# Patient Record
Sex: FEMALE | Race: WHITE | NOT HISPANIC OR LATINO | Employment: UNEMPLOYED | URBAN - METROPOLITAN AREA
[De-identification: names, ages, dates, MRNs, and addresses within clinical notes are randomized per-mention and may not be internally consistent; named-entity substitution may affect disease eponyms.]

---
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
## 2024-01-03 DIAGNOSIS — F41.9 ANXIETY: ICD-10-CM

## 2024-01-03 RX ORDER — BUSPIRONE HYDROCHLORIDE 10 MG/1
TABLET ORAL
Qty: 270 TABLET | Refills: 0 | Status: SHIPPED | OUTPATIENT
Start: 2024-01-03

## 2024-04-07 DIAGNOSIS — F41.9 ANXIETY: ICD-10-CM

## 2024-04-08 RX ORDER — BUSPIRONE HYDROCHLORIDE 10 MG/1
TABLET ORAL
Qty: 270 TABLET | Refills: 0 | Status: SHIPPED | OUTPATIENT
Start: 2024-04-08

## 2024-04-23 ENCOUNTER — OFFICE VISIT (OUTPATIENT)
Dept: URGENT CARE | Facility: CLINIC | Age: 19
End: 2024-04-23
Payer: COMMERCIAL

## 2024-04-23 VITALS
WEIGHT: 131 LBS | RESPIRATION RATE: 16 BRPM | BODY MASS INDEX: 21.83 KG/M2 | HEIGHT: 65 IN | TEMPERATURE: 99.1 F | OXYGEN SATURATION: 97 % | HEART RATE: 90 BPM

## 2024-04-23 DIAGNOSIS — J02.9 SORE THROAT: ICD-10-CM

## 2024-04-23 DIAGNOSIS — J02.0 STREP PHARYNGITIS: Primary | ICD-10-CM

## 2024-04-23 LAB — S PYO AG THROAT QL: NEGATIVE

## 2024-04-23 PROCEDURE — G0382 LEV 3 HOSP TYPE B ED VISIT: HCPCS | Performed by: FAMILY MEDICINE

## 2024-04-23 PROCEDURE — 87070 CULTURE OTHR SPECIMN AEROBIC: CPT | Performed by: FAMILY MEDICINE

## 2024-04-23 PROCEDURE — 87880 STREP A ASSAY W/OPTIC: CPT | Performed by: FAMILY MEDICINE

## 2024-04-23 RX ORDER — METHYLPREDNISOLONE 4 MG/1
TABLET ORAL
Qty: 21 TABLET | Refills: 0 | Status: SHIPPED | OUTPATIENT
Start: 2024-04-23

## 2024-04-23 RX ORDER — AMOXICILLIN 500 MG/1
500 CAPSULE ORAL EVERY 12 HOURS SCHEDULED
Qty: 20 CAPSULE | Refills: 0 | Status: SHIPPED | OUTPATIENT
Start: 2024-04-23 | End: 2024-05-03

## 2024-04-23 NOTE — PROGRESS NOTES
[facility]        NAME: Dasha Da Silva is a 18 y.o. female  : 2005    MRN: [id number]  DATE: 2024  TIME: 12:09 PM    Assessment and Plan   Strep pharyngitis [J02.0]  1. Strep pharyngitis  amoxicillin (AMOXIL) 500 mg capsule    methylPREDNISolone 4 MG tablet therapy pack      2. Sore throat  POCT rapid ANTIGEN strepA            Patient Instructions       Follow up with PCP in 3-5 days.  Proceed to  ER if symptoms worsen.    If tests have been performed at [facility], our office will contact you with results if changes need to be made to the care plan discussed with you at the visit.  [facility]    Chief Complaint     Chief Complaint   Patient presents with    Sore Throat     Pt presents with sore throat, swollen tonsils, and swollen lymph nodes x 1 day.  Fever this morning around 100.  She tylenol this am.           History of Present Illness       18-year-old female with 3-day history of sore throat and painful swallowing.  She does report beginning with a fever this morning around 100 despite using Tylenol.  Denies any headaches nausea or vomiting.    Sore Throat         Review of Systems   Review of Systems   Constitutional: Negative.    HENT:  Positive for sore throat.    Eyes: Negative.    Respiratory: Negative.     Cardiovascular: Negative.    Gastrointestinal: Negative.    Genitourinary: Negative.    Musculoskeletal:  Positive for myalgias.   Skin: Negative.    Allergic/Immunologic: Negative.    Neurological: Negative.    Hematological: Negative.    Psychiatric/Behavioral: Negative.           Current Medications       Current Outpatient Medications:     Adapalene 0.3 % gel, , Disp: , Rfl:     amoxicillin (AMOXIL) 500 mg capsule, Take 1 capsule (500 mg total) by mouth every 12 (twelve) hours for 10 days, Disp: 20 capsule, Rfl: 0    busPIRone (BUSPAR) 10 mg tablet, TAKE 1 TABLET BY MOUTH THREE TIMES A DAY AS NEEDED FOR ANXIETY, Disp: 270  "tablet, Rfl: 0    drospirenone-ethinyl estradiol (Vestura) 3-0.02 MG per tablet, Take 1 tablet by mouth daily, Disp: 84 tablet, Rfl: 3    methylPREDNISolone 4 MG tablet therapy pack, Use as directed on package, Disp: 21 tablet, Rfl: 0    sertraline (ZOLOFT) 50 mg tablet, TAKE 1 AND 1/2 TABLETS BY MOUTH DAILY, Disp: 135 tablet, Rfl: 3    spironolactone (ALDACTONE) 50 mg tablet, Take 50 mg by mouth 2 (two) times a day, Disp: , Rfl:     Current Allergies     Allergies as of 04/23/2024    (No Known Allergies)            The following portions of the patient's history were reviewed and updated as appropriate: allergies, current medications, past family history, past medical history, past social history, past surgical history and problem list.     Past Medical History:   Diagnosis Date    Concussion     Tinea versicolor        Past Surgical History:   Procedure Laterality Date    NO PAST SURGERIES         Family History   Problem Relation Age of Onset    Other Father         smoker    Heart attack Other     Skin cancer Maternal Grandmother     Cancer Maternal Grandfather         bone    Skin cancer Maternal Grandfather          Medications have been verified.        Objective   Pulse 90   Temp 99.1 °F (37.3 °C)   Resp 16   Ht 5' 5\" (1.651 m)   Wt 59.4 kg (131 lb)   SpO2 97%   BMI 21.80 kg/m²   No LMP recorded. (Menstrual status: Birth Control).       Physical Exam     Physical Exam  Vitals and nursing note reviewed.   Constitutional:       Appearance: She is well-developed.   HENT:      Head: Normocephalic.      Right Ear: Tympanic membrane and ear canal normal.      Left Ear: Tympanic membrane and ear canal normal.      Nose: Nose normal.      Mouth/Throat:      Pharynx: Pharyngeal swelling, oropharyngeal exudate and posterior oropharyngeal erythema present.      Tonsils: No tonsillar exudate.   Eyes:      Pupils: Pupils are equal, round, and reactive to light.   Cardiovascular:      Rate and Rhythm: Normal rate " and regular rhythm.      Heart sounds: Normal heart sounds.   Pulmonary:      Effort: Pulmonary effort is normal.   Abdominal:      General: Abdomen is flat.   Musculoskeletal:         General: Normal range of motion.      Cervical back: Normal range of motion.   Skin:     General: Skin is warm and dry.   Neurological:      Mental Status: She is alert and oriented to person, place, and time.

## 2024-04-25 LAB — BACTERIA THROAT CULT: NORMAL

## 2024-05-20 DIAGNOSIS — F41.9 ANXIETY: ICD-10-CM

## 2024-07-09 DIAGNOSIS — F41.9 ANXIETY: ICD-10-CM

## 2024-07-09 RX ORDER — BUSPIRONE HYDROCHLORIDE 10 MG/1
TABLET ORAL
Qty: 270 TABLET | Refills: 0 | Status: SHIPPED | OUTPATIENT
Start: 2024-07-09

## 2024-09-04 ENCOUNTER — OFFICE VISIT (OUTPATIENT)
Dept: FAMILY MEDICINE CLINIC | Facility: CLINIC | Age: 19
End: 2024-09-04
Payer: COMMERCIAL

## 2024-09-04 ENCOUNTER — TELEPHONE (OUTPATIENT)
Age: 19
End: 2024-09-04

## 2024-09-04 VITALS
WEIGHT: 139 LBS | BODY MASS INDEX: 23.16 KG/M2 | HEIGHT: 65 IN | HEART RATE: 99 BPM | SYSTOLIC BLOOD PRESSURE: 100 MMHG | OXYGEN SATURATION: 97 % | DIASTOLIC BLOOD PRESSURE: 60 MMHG | TEMPERATURE: 99.3 F

## 2024-09-04 DIAGNOSIS — J01.00 ACUTE NON-RECURRENT MAXILLARY SINUSITIS: Primary | ICD-10-CM

## 2024-09-04 DIAGNOSIS — R05.1 ACUTE COUGH: ICD-10-CM

## 2024-09-04 PROCEDURE — 99213 OFFICE O/P EST LOW 20 MIN: CPT | Performed by: FAMILY MEDICINE

## 2024-09-04 RX ORDER — BENZONATATE 200 MG/1
200 CAPSULE ORAL 3 TIMES DAILY PRN
Qty: 20 CAPSULE | Refills: 0 | Status: SHIPPED | OUTPATIENT
Start: 2024-09-04

## 2024-09-04 RX ORDER — AMOXICILLIN 875 MG
875 TABLET ORAL 2 TIMES DAILY
Qty: 20 TABLET | Refills: 0 | Status: SHIPPED | OUTPATIENT
Start: 2024-09-04 | End: 2024-09-14

## 2024-09-04 NOTE — TELEPHONE ENCOUNTER
Pt mother called requesting a Dr note for college for dates 9/4/24-9/6/24 be placed in her Mychart please. Thank you

## 2024-09-04 NOTE — PROGRESS NOTES
Assessment/Plan:      1. Acute non-recurrent maxillary sinusitis  Assessment & Plan:  Start amoxicillin 1 tab twice a day     Orders:  -     amoxicillin (AMOXIL) 875 mg tablet; Take 1 tablet (875 mg total) by mouth 2 (two) times a day for 10 days  2. Acute cough  Assessment & Plan:  Benzonatate 1 tab three times a day as needed.   Orders:  -     benzonatate (TESSALON) 200 MG capsule; Take 1 capsule (200 mg total) by mouth 3 (three) times a day as needed for cough        Subjective:  Chief Complaint   Patient presents with    Cold Like Symptoms     Congestion, cough and runny nose. Began Monday with a sore throat. Has taken Dayquil and Zyrtec. Tested Tuesday morning for Covid and it was negative.         Patient ID: Dasha Da Silva is a 19 y.o. female.    Started with congestion and sore throat, started on Monday with a sore throat  Tight in her chest and dry cough  Low grade fever  Dayquil and nyquil with some relief, and zyrtec  Trouble sleeping at night.   Nasal congestion.   Constant sinus pressure.         Review of Systems   Constitutional:  Positive for fatigue.   HENT:  Positive for congestion, postnasal drip, sinus pressure and sore throat.    Eyes: Negative.    Respiratory:  Positive for cough. Negative for shortness of breath.    Cardiovascular: Negative.    Gastrointestinal: Negative.    Genitourinary: Negative.    Musculoskeletal: Negative.    Skin: Negative.    Allergic/Immunologic: Positive for environmental allergies.   Neurological:  Positive for headaches.   Hematological: Negative.    Psychiatric/Behavioral: Negative.           The following portions of the patient's history were reviewed and updated as appropriate: allergies, current medications, past family history, past medical history, past social history, past surgical history and problem list.    Objective:  Vitals:    09/04/24 0955   BP: 100/60   BP Location: Left arm   Patient Position: Sitting   Cuff Size: Standard   Pulse: 99   Temp:  "99.3 °F (37.4 °C)   TempSrc: Tympanic   SpO2: 97%   Weight: 63 kg (139 lb)   Height: 5' 5\" (1.651 m)      Physical Exam  Vitals and nursing note reviewed.   Constitutional:       General: She is not in acute distress.     Appearance: She is well-developed. She is not diaphoretic.   HENT:      Head: Normocephalic and atraumatic.      Right Ear: Tympanic membrane, ear canal and external ear normal.      Left Ear: Tympanic membrane, ear canal and external ear normal.      Nose: Mucosal edema, congestion and rhinorrhea present.      Right Sinus: Maxillary sinus tenderness and frontal sinus tenderness present.      Left Sinus: Maxillary sinus tenderness and frontal sinus tenderness present.      Mouth/Throat:      Mouth: Mucous membranes are normal.      Pharynx: Posterior oropharyngeal edema and posterior oropharyngeal erythema present. No oropharyngeal exudate.   Eyes:      Conjunctiva/sclera: Conjunctivae normal.      Pupils: Pupils are equal, round, and reactive to light.   Neck:      Comments: Few anterior cervical nodes with tnederness  Cardiovascular:      Rate and Rhythm: Normal rate and regular rhythm.      Heart sounds: Normal heart sounds.   Pulmonary:      Effort: Pulmonary effort is normal. No respiratory distress.      Breath sounds: Normal breath sounds. No wheezing.   Abdominal:      General: Bowel sounds are normal.      Palpations: Abdomen is soft.   Musculoskeletal:         General: Normal range of motion.      Cervical back: Normal range of motion and neck supple.   Lymphadenopathy:      Cervical: Cervical adenopathy present.   Skin:     General: Skin is warm and dry.      Findings: No rash.   Neurological:      Mental Status: She is alert and oriented to person, place, and time.   Psychiatric:         Mood and Affect: Mood and affect normal.         Behavior: Behavior normal.         Thought Content: Thought content normal.         Judgment: Judgment normal.         "

## 2024-09-06 DIAGNOSIS — N94.6 DYSMENORRHEA IN ADOLESCENT: ICD-10-CM

## 2024-09-06 RX ORDER — DROSPIRENONE AND ETHINYL ESTRADIOL 0.02-3(28)
1 KIT ORAL DAILY
Qty: 84 TABLET | Refills: 1 | Status: SHIPPED | OUTPATIENT
Start: 2024-09-06

## 2024-09-15 PROBLEM — K13.0 TRAUMATIC LIP PAIN: Status: RESOLVED | Noted: 2021-02-23 | Resolved: 2024-09-15

## 2024-09-15 PROBLEM — Z71.82 EXERCISE COUNSELING: Status: RESOLVED | Noted: 2019-07-13 | Resolved: 2024-09-15

## 2024-09-15 PROBLEM — J01.00 ACUTE NON-RECURRENT MAXILLARY SINUSITIS: Status: ACTIVE | Noted: 2024-09-15

## 2024-09-15 PROBLEM — J02.0 STREP PHARYNGITIS: Status: RESOLVED | Noted: 2022-03-04 | Resolved: 2024-09-15

## 2024-09-15 PROBLEM — R05.1 ACUTE COUGH: Status: ACTIVE | Noted: 2024-09-15

## 2024-09-15 PROBLEM — S06.0X0A CONCUSSION WITHOUT LOSS OF CONSCIOUSNESS: Status: RESOLVED | Noted: 2018-06-15 | Resolved: 2024-09-15

## 2024-09-15 PROBLEM — Z71.3 NUTRITIONAL COUNSELING: Status: RESOLVED | Noted: 2019-07-13 | Resolved: 2024-09-15

## 2024-10-11 DIAGNOSIS — F41.9 ANXIETY: ICD-10-CM

## 2024-10-11 RX ORDER — BUSPIRONE HYDROCHLORIDE 10 MG/1
TABLET ORAL
Qty: 270 TABLET | Refills: 1 | Status: SHIPPED | OUTPATIENT
Start: 2024-10-11

## 2024-10-15 PROBLEM — R05.1 ACUTE COUGH: Status: RESOLVED | Noted: 2024-09-15 | Resolved: 2024-10-15

## 2024-10-15 PROBLEM — J01.00 ACUTE NON-RECURRENT MAXILLARY SINUSITIS: Status: RESOLVED | Noted: 2024-09-15 | Resolved: 2024-10-15

## 2024-12-03 ENCOUNTER — OFFICE VISIT (OUTPATIENT)
Dept: FAMILY MEDICINE CLINIC | Facility: CLINIC | Age: 19
End: 2024-12-03
Payer: COMMERCIAL

## 2024-12-03 VITALS
WEIGHT: 136 LBS | TEMPERATURE: 97.7 F | HEART RATE: 80 BPM | DIASTOLIC BLOOD PRESSURE: 60 MMHG | HEIGHT: 65 IN | OXYGEN SATURATION: 98 % | SYSTOLIC BLOOD PRESSURE: 108 MMHG | BODY MASS INDEX: 22.66 KG/M2

## 2024-12-03 DIAGNOSIS — G89.29 CHRONIC BILATERAL LOW BACK PAIN WITHOUT SCIATICA: ICD-10-CM

## 2024-12-03 DIAGNOSIS — M41.35 THORACOGENIC SCOLIOSIS OF THORACOLUMBAR REGION: ICD-10-CM

## 2024-12-03 DIAGNOSIS — Z13.0 SCREENING FOR ENDOCRINE, METABOLIC AND IMMUNITY DISORDER: ICD-10-CM

## 2024-12-03 DIAGNOSIS — Z13.228 SCREENING FOR ENDOCRINE, METABOLIC AND IMMUNITY DISORDER: ICD-10-CM

## 2024-12-03 DIAGNOSIS — M54.9 CHRONIC UPPER BACK PAIN: ICD-10-CM

## 2024-12-03 DIAGNOSIS — Z13.1 SCREENING FOR DIABETES MELLITUS (DM): ICD-10-CM

## 2024-12-03 DIAGNOSIS — Z13.29 SCREENING FOR ENDOCRINE, METABOLIC AND IMMUNITY DISORDER: ICD-10-CM

## 2024-12-03 DIAGNOSIS — G89.29 CHRONIC UPPER BACK PAIN: ICD-10-CM

## 2024-12-03 DIAGNOSIS — Z13.220 SCREENING FOR LIPID DISORDERS: ICD-10-CM

## 2024-12-03 DIAGNOSIS — M54.50 CHRONIC BILATERAL LOW BACK PAIN WITHOUT SCIATICA: ICD-10-CM

## 2024-12-03 DIAGNOSIS — Z00.00 WELL ADULT EXAM: Primary | ICD-10-CM

## 2024-12-03 DIAGNOSIS — Z13.0 SCREENING FOR DEFICIENCY ANEMIA: ICD-10-CM

## 2024-12-03 PROCEDURE — 99395 PREV VISIT EST AGE 18-39: CPT | Performed by: FAMILY MEDICINE

## 2024-12-03 PROCEDURE — 99213 OFFICE O/P EST LOW 20 MIN: CPT | Performed by: FAMILY MEDICINE

## 2024-12-03 NOTE — PATIENT INSTRUCTIONS
"Xray scoliosis series   Physical therapy Patient Education     Routine physical for adults   The Basics   Written by the doctors and editors at [facility]   What is a physical? -- A physical is a routine visit, or \"check-up,\" with your doctor. You might also hear it called a \"wellness visit\" or \"preventive visit.\"  During each visit, the doctor will:   Ask about your physical and mental health   Ask about your habits, behaviors, and lifestyle   Do an exam   Give you vaccines if needed   Talk to you about any medicines you take   Give advice about your health   Answer your questions  Getting regular check-ups is an important part of taking care of your health. It can help your doctor find and treat any problems you have. But it's also important for preventing health problems.  A routine physical is different from a \"sick visit.\" A sick visit is when you see a doctor because of a health concern or problem. Since physicals are scheduled ahead of time, you can think about what you want to ask the doctor.  How often should I get a physical? -- It depends on your age and health. In general, for people age 21 years and older:   If you are younger than 50 years, you might be able to get a physical every 3 years.   If you are 50 years or older, your doctor might recommend a physical every year.  If you have an ongoing health condition, like diabetes or high blood pressure, your doctor will probably want to see you more often.  What happens during a physical? -- In general, each visit will include:   Physical exam - The doctor or nurse will check your height, weight, heart rate, and blood pressure. They will also look at your eyes and ears. They will ask about how you are feeling and whether you have any symptoms that bother you.   Medicines - It's a good idea to bring a list of all the medicines you take to each doctor visit. Your doctor will talk to you about your medicines and answer any questions. Tell them if you are " "having any side effects that bother you. You should also tell them if you are having trouble paying for any of your medicines.   Habits and behaviors - This includes:   Your diet   Your exercise habits   Whether you smoke, drink alcohol, or use drugs   Whether you are sexually active   Whether you feel safe at home  Your doctor will talk to you about things you can do to improve your health and lower your risk of health problems. They will also offer help and support. For example, if you want to quit smoking, they can give you advice and might prescribe medicines. If you want to improve your diet or get more physical activity, they can help you with this, too.   Lab tests, if needed - The tests you get will depend on your age and situation. For example, your doctor might want to check your:   Cholesterol   Blood sugar   Iron level   Vaccines - The recommended vaccines will depend on your age, health, and what vaccines you already had. Vaccines are very important because they can prevent certain serious or deadly infections.   Discussion of screening - \"Screening\" means checking for diseases or other health problems before they cause symptoms. Your doctor can recommend screening based on your age, risk, and preferences. This might include tests to check for:   Cancer, such as breast, prostate, cervical, ovarian, colorectal, prostate, lung, or skin cancer   Sexually transmitted infections, such as chlamydia and gonorrhea   Mental health conditions like depression and anxiety  Your doctor will talk to you about the different types of screening tests. They can help you decide which screenings to have. They can also explain what the results might mean.   Answering questions - The physical is a good time to ask the doctor or nurse questions about your health. If needed, they can refer you to other doctors or specialists, too.  Adults older than 65 years often need other care, too. As you get older, your doctor will talk " to you about:   How to prevent falling at home   Hearing or vision tests   Memory testing   How to take your medicines safely   Making sure that you have the help and support you need at home  All topics are updated as new evidence becomes available and our peer review process is complete.  This topic retrieved from [employer] on: May 02, 2024.  Topic 660707 Version 1.0  Release: 32.4.3 - C32.122  © 2024 UpToDate, Inc. and/or its affiliates. All rights reserved.  Consumer Information Use and Disclaimer   Disclaimer: This generalized information is a limited summary of diagnosis, treatment, and/or medication information. It is not meant to be comprehensive and should be used as a tool to help the user understand and/or assess potential diagnostic and treatment options. It does NOT include all information about conditions, treatments, medications, side effects, or risks that may apply to a specific patient. It is not intended to be medical advice or a substitute for the medical advice, diagnosis, or treatment of a health care provider based on the health care provider's examination and assessment of a patient's specific and unique circumstances. Patients must speak with a health care provider for complete information about their health, medical questions, and treatment options, including any risks or benefits regarding use of medications. This information does not endorse any treatments or medications as safe, effective, or approved for treating a specific patient. UpToDate, Inc. and its affiliates disclaim any warranty or liability relating to this information or the use thereof.The use of this information is governed by the Terms of Use, available at https://www.wolterskluwer.com/en/know/clinical-effectiveness-terms. 2024© UpToDate, Inc. and its affiliates and/or licensors. All rights reserved.  Copyright   © 2024 UpToDate, Inc. and/or its affiliates. All rights reserved.

## 2024-12-03 NOTE — PROGRESS NOTES
Adult Annual Physical  Name: Dasha Da Silva      : 2005      MRN: [id number]  Encounter Provider: Allison Mccoy DO  Encounter Date: 12/3/2024   Encounter department: [facility]    Assessment & Plan  Well adult exam         Thoracogenic scoliosis of thoracolumbar region  Walk-in xray for scoliosis series- likely cause of back pain.  Getting worse over past 6 months.  May need mri  Orders:    XR entire spine (scoliosis) 4-5 vw; Future    Chronic upper back pain  scoliosis  Orders:    XR entire spine (scoliosis) 4-5 vw; Future    Chronic bilateral low back pain without sciatica  Likely as a result of scoliosis of upper back  Orders:    XR entire spine (scoliosis) 4-5 vw; Future    Screening for deficiency anemia  Check cbc  Orders:    CBC and differential; Future    Screening for diabetes mellitus (DM)    Orders:    Comprehensive metabolic panel; Future    Screening for endocrine, metabolic and immunity disorder    Orders:    Comprehensive metabolic panel; Future    TSH, 3rd generation with Free T4 reflex; Future    Screening for lipid disorders    Orders:    Lipid Panel with Direct LDL reflex    Immunizations and preventive care screenings were discussed with patient today. Appropriate education was printed on patient's after visit summary.    Counseling:  Dental Health: discussed importance of regular tooth brushing, flossing, and dental visits.  Exercise: the importance of regular exercise/physical activity was discussed. Recommend exercise 3-5 times per week for at least 30 minutes.          History of Present Illness     Adult Annual Physical:  Patient presents for annual physical. Pt is here for a physical.  Complains of upper back feels tight and lower back feels loose  When Standing feels like she needs to stretch.   Has a standing desk- temporary relief  Constant pain in low back  Most comfortable position laying on her side with 1 leg up  Complains of a Pressure and dull ache  "  Getting worse over the past 6 months.   Was lifting and moved wrong  Medicines: no medications  Heat helps with pain and heat helps temporarily  Bilateral hamstring pain when slouching. .     Review of Systems   Constitutional: Negative.  Negative for fatigue and fever.   HENT: Negative.     Eyes: Negative.    Respiratory: Negative.  Negative for cough.    Cardiovascular: Negative.    Gastrointestinal: Negative.    Endocrine: Negative.    Genitourinary: Negative.    Musculoskeletal:  Positive for back pain.   Skin: Negative.    Allergic/Immunologic: Negative.    Neurological: Negative.    Psychiatric/Behavioral: Negative.       Medical History Reviewed by provider this encounter:  Tobacco  Allergies  Meds  Problems  Med Hx  Surg Hx  Fam Hx     .  Current Outpatient Medications on File Prior to Visit   Medication Sig Dispense Refill    Adapalene 0.3 % gel       busPIRone (BUSPAR) 10 mg tablet TAKE 1 TABLET BY MOUTH THREE TIMES A DAY AS NEEDED FOR ANXIETY 270 tablet 1    drospirenone-ethinyl estradiol (Vestura) 3-0.02 MG per tablet TAKE 1 TABLET BY MOUTH EVERY DAY 84 tablet 1    sertraline (ZOLOFT) 50 mg tablet TAKE 1 AND 1/2 TABLETS DAILY BY MOUTH 135 tablet 1     No current facility-administered medications on file prior to visit.      Social History     Tobacco Use    Smoking status: Never    Smokeless tobacco: Never   Vaping Use    Vaping status: Never Used   Substance and Sexual Activity    Alcohol use: No    Drug use: No    Sexual activity: Not on file       Objective   /60   Pulse 80   Temp 97.7 °F (36.5 °C) (Tympanic)   Ht 5' 5\" (1.651 m)   Wt 61.7 kg (136 lb)   SpO2 98%   BMI 22.63 kg/m²     Physical Exam  Vitals and nursing note reviewed.   Constitutional:       Appearance: She is well-developed.   HENT:      Head: Normocephalic and atraumatic.      Right Ear: External ear normal.      Left Ear: External ear normal.      Nose: Nose normal.   Eyes:      Conjunctiva/sclera: Conjunctivae " normal.      Pupils: Pupils are equal, round, and reactive to light.   Cardiovascular:      Rate and Rhythm: Normal rate and regular rhythm.      Heart sounds: Normal heart sounds.   Pulmonary:      Effort: Pulmonary effort is normal.      Breath sounds: Normal breath sounds.   Abdominal:      General: Bowel sounds are normal.      Palpations: Abdomen is soft.   Musculoskeletal:         General: Normal range of motion.      Cervical back: Normal range of motion and neck supple.      Comments: Scoliosis thoracic right rotation   Skin:     General: Skin is warm and dry.   Neurological:      Mental Status: She is alert and oriented to person, place, and time.   Psychiatric:         Behavior: Behavior normal.         Thought Content: Thought content normal.         Judgment: Judgment normal.

## 2024-12-03 NOTE — ASSESSMENT & PLAN NOTE
Walk-in xray for scoliosis series- likely cause of back pain.  Getting worse over past 6 months.  May need mri  Orders:    XR entire spine (scoliosis) 4-5 vw; Future

## 2024-12-08 PROBLEM — G89.29 CHRONIC UPPER BACK PAIN: Status: ACTIVE | Noted: 2024-12-08

## 2024-12-08 PROBLEM — G89.29 CHRONIC BILATERAL LOW BACK PAIN WITHOUT SCIATICA: Status: ACTIVE | Noted: 2024-12-08

## 2024-12-08 PROBLEM — M54.9 CHRONIC UPPER BACK PAIN: Status: ACTIVE | Noted: 2024-12-08

## 2024-12-08 PROBLEM — M54.50 CHRONIC BILATERAL LOW BACK PAIN WITHOUT SCIATICA: Status: ACTIVE | Noted: 2024-12-08

## 2024-12-09 ENCOUNTER — APPOINTMENT (OUTPATIENT)
Dept: RADIOLOGY | Facility: CLINIC | Age: 19
End: 2024-12-09
Payer: COMMERCIAL

## 2024-12-09 DIAGNOSIS — M54.50 CHRONIC BILATERAL LOW BACK PAIN WITHOUT SCIATICA: ICD-10-CM

## 2024-12-09 DIAGNOSIS — M54.9 CHRONIC UPPER BACK PAIN: ICD-10-CM

## 2024-12-09 DIAGNOSIS — M41.35 THORACOGENIC SCOLIOSIS OF THORACOLUMBAR REGION: ICD-10-CM

## 2024-12-09 DIAGNOSIS — G89.29 CHRONIC UPPER BACK PAIN: ICD-10-CM

## 2024-12-09 DIAGNOSIS — G89.29 CHRONIC BILATERAL LOW BACK PAIN WITHOUT SCIATICA: ICD-10-CM

## 2024-12-09 PROCEDURE — 72083 X-RAY EXAM ENTIRE SPI 4/5 VW: CPT

## 2024-12-09 NOTE — ASSESSMENT & PLAN NOTE
Likely as a result of scoliosis of upper back  Orders:    XR entire spine (scoliosis) 4-5 vw; Future

## 2024-12-10 LAB
ALBUMIN SERPL-MCNC: 4.3 G/DL (ref 3.6–5.1)
ALBUMIN/GLOB SERPL: 1.6 (CALC) (ref 1–2.5)
ALP SERPL-CCNC: 65 U/L (ref 31–125)
ALT SERPL-CCNC: 10 U/L (ref 6–29)
AST SERPL-CCNC: 13 U/L (ref 10–30)
BASOPHILS # BLD AUTO: 112 CELLS/UL (ref 0–200)
BASOPHILS NFR BLD AUTO: 1.8 %
BILIRUB SERPL-MCNC: 0.4 MG/DL (ref 0.2–1.2)
BUN SERPL-MCNC: 13 MG/DL (ref 7–25)
BUN/CREAT SERPL: NORMAL (CALC) (ref 6–22)
CALCIUM SERPL-MCNC: 9.5 MG/DL (ref 8.6–10.2)
CHLORIDE SERPL-SCNC: 103 MMOL/L (ref 98–110)
CHOLEST SERPL-MCNC: 139 MG/DL
CHOLEST/HDLC SERPL: 2.3 (CALC)
CO2 SERPL-SCNC: 25 MMOL/L (ref 20–32)
CREAT SERPL-MCNC: 0.76 MG/DL (ref 0.5–0.96)
EOSINOPHIL # BLD AUTO: 136 CELLS/UL (ref 15–500)
EOSINOPHIL NFR BLD AUTO: 2.2 %
ERYTHROCYTE [DISTWIDTH] IN BLOOD BY AUTOMATED COUNT: 11.9 % (ref 11–15)
GFR/BSA.PRED SERPLBLD CYS-BASED-ARV: 115 ML/MIN/1.73M2
GLOBULIN SER CALC-MCNC: 2.7 G/DL (CALC) (ref 1.9–3.7)
GLUCOSE SERPL-MCNC: 80 MG/DL (ref 65–99)
HCT VFR BLD AUTO: 37.6 % (ref 35–45)
HDLC SERPL-MCNC: 60 MG/DL
HGB BLD-MCNC: 12.5 G/DL (ref 11.7–15.5)
LDLC SERPL CALC-MCNC: 65 MG/DL (CALC)
LYMPHOCYTES # BLD AUTO: 2691 CELLS/UL (ref 850–3900)
LYMPHOCYTES NFR BLD AUTO: 43.4 %
MCH RBC QN AUTO: 28.5 PG (ref 27–33)
MCHC RBC AUTO-ENTMCNC: 33.2 G/DL (ref 32–36)
MCV RBC AUTO: 85.6 FL (ref 80–100)
MONOCYTES # BLD AUTO: 496 CELLS/UL (ref 200–950)
MONOCYTES NFR BLD AUTO: 8 %
NEUTROPHILS # BLD AUTO: 2765 CELLS/UL (ref 1500–7800)
NEUTROPHILS NFR BLD AUTO: 44.6 %
NONHDLC SERPL-MCNC: 79 MG/DL (CALC)
PLATELET # BLD AUTO: 274 THOUSAND/UL (ref 140–400)
PMV BLD REES-ECKER: 10.6 FL (ref 7.5–12.5)
POTASSIUM SERPL-SCNC: 4.2 MMOL/L (ref 3.5–5.3)
PROT SERPL-MCNC: 7 G/DL (ref 6.1–8.1)
RBC # BLD AUTO: 4.39 MILLION/UL (ref 3.8–5.1)
SODIUM SERPL-SCNC: 136 MMOL/L (ref 135–146)
TRIGL SERPL-MCNC: 67 MG/DL
TSH SERPL-ACNC: 1.82 MIU/L
WBC # BLD AUTO: 6.2 THOUSAND/UL (ref 3.8–10.8)

## 2025-01-07 PROBLEM — Z00.00 WELL ADULT EXAM: Status: RESOLVED | Noted: 2020-07-15 | Resolved: 2025-01-07

## 2025-01-22 ENCOUNTER — RESULTS FOLLOW-UP (OUTPATIENT)
Dept: FAMILY MEDICINE CLINIC | Facility: CLINIC | Age: 20
End: 2025-01-22

## 2025-01-22 DIAGNOSIS — M41.35 THORACOGENIC SCOLIOSIS OF THORACOLUMBAR REGION: ICD-10-CM

## 2025-01-22 DIAGNOSIS — G89.29 CHRONIC UPPER BACK PAIN: ICD-10-CM

## 2025-01-22 DIAGNOSIS — M54.9 CHRONIC UPPER BACK PAIN: ICD-10-CM

## 2025-01-22 DIAGNOSIS — M54.50 CHRONIC BILATERAL LOW BACK PAIN WITHOUT SCIATICA: Primary | ICD-10-CM

## 2025-01-22 DIAGNOSIS — G89.29 CHRONIC BILATERAL LOW BACK PAIN WITHOUT SCIATICA: Primary | ICD-10-CM

## 2025-01-28 ENCOUNTER — OFFICE VISIT (OUTPATIENT)
Dept: FAMILY MEDICINE CLINIC | Facility: CLINIC | Age: 20
End: 2025-01-28
Payer: COMMERCIAL

## 2025-01-28 VITALS
WEIGHT: 134 LBS | TEMPERATURE: 97.8 F | OXYGEN SATURATION: 97 % | HEIGHT: 65 IN | SYSTOLIC BLOOD PRESSURE: 92 MMHG | BODY MASS INDEX: 22.33 KG/M2 | HEART RATE: 81 BPM | DIASTOLIC BLOOD PRESSURE: 60 MMHG

## 2025-01-28 DIAGNOSIS — J45.20 MILD INTERMITTENT REACTIVE AIRWAY DISEASE WITHOUT COMPLICATION: ICD-10-CM

## 2025-01-28 DIAGNOSIS — J02.9 PHARYNGITIS, UNSPECIFIED ETIOLOGY: Primary | ICD-10-CM

## 2025-01-28 LAB — S PYO AG THROAT QL: NEGATIVE

## 2025-01-28 PROCEDURE — 99213 OFFICE O/P EST LOW 20 MIN: CPT | Performed by: FAMILY MEDICINE

## 2025-01-28 PROCEDURE — 87880 STREP A ASSAY W/OPTIC: CPT | Performed by: FAMILY MEDICINE

## 2025-01-28 RX ORDER — SPIRONOLACTONE 50 MG/1
1 TABLET, FILM COATED ORAL 2 TIMES DAILY
COMMUNITY
Start: 2024-12-10

## 2025-01-28 RX ORDER — AMOXICILLIN 875 MG/1
875 TABLET, COATED ORAL 2 TIMES DAILY
Qty: 14 TABLET | Refills: 0 | Status: SHIPPED | OUTPATIENT
Start: 2025-01-28 | End: 2025-02-04

## 2025-01-28 RX ORDER — PREDNISONE 10 MG/1
TABLET ORAL
Qty: 20 TABLET | Refills: 0 | Status: SHIPPED | OUTPATIENT
Start: 2025-01-28 | End: 2025-02-12 | Stop reason: ALTCHOICE

## 2025-01-28 NOTE — PROGRESS NOTES
"Name: Dasha Da Silva      : 2005      MRN: [id number]  Encounter Provider: Allison Mccoy DO  Encounter Date: 2025   Encounter department: [facility]  :  Assessment & Plan  Pharyngitis, unspecified etiology  Start amoxicillin 1 tab twice a day  Rapid strep test negative  Orders:    amoxicillin (AMOXIL) 875 mg tablet; Take 1 tablet (875 mg total) by mouth 2 (two) times a day for 7 days    POCT rapid ANTIGEN strepA    Mild intermittent reactive airway disease without complication  Prednisone taper   Orders:    predniSONE 10 mg tablet; 4 tabs for 2 days, 3 tabs for 2 days, 2 tabs for 2 days and 1 tab for 2 days          Depression Screening and Follow-up Plan: Patient was screened for depression during today's encounter. They screened negative with a PHQ-2 score of 0.      History of Present Illness   Sore throat on Saturday.  with pain in chest pain  Mucinex. Some tightness. No fever. Sore throat remains.       Review of Systems   Constitutional: Negative.  Negative for fatigue and fever.   HENT:  Positive for sore throat.    Eyes: Negative.    Respiratory:  Positive for shortness of breath. Negative for cough.    Cardiovascular:  Positive for chest pain.   Gastrointestinal: Negative.    Endocrine: Negative.    Genitourinary: Negative.    Musculoskeletal: Negative.    Skin: Negative.    Allergic/Immunologic: Negative.    Neurological: Negative.    Psychiatric/Behavioral: Negative.         Objective   BP 92/60   Pulse 81   Temp 97.8 °F (36.6 °C) (Tympanic)   Ht 5' 5\" (1.651 m)   Wt 60.8 kg (134 lb)   SpO2 97%   BMI 22.30 kg/m²      Physical Exam  Vitals and nursing note reviewed.   Constitutional:       Appearance: She is well-developed.   HENT:      Head: Normocephalic and atraumatic.      Right Ear: External ear normal.      Left Ear: External ear normal.      Nose: Nose normal.      Mouth/Throat:      Pharynx: Posterior oropharyngeal erythema present. No oropharyngeal " exudate.   Eyes:      Conjunctiva/sclera: Conjunctivae normal.      Pupils: Pupils are equal, round, and reactive to light.   Cardiovascular:      Rate and Rhythm: Normal rate and regular rhythm.      Heart sounds: Normal heart sounds.   Pulmonary:      Effort: Pulmonary effort is normal.      Breath sounds: Normal breath sounds.   Abdominal:      General: Bowel sounds are normal.      Palpations: Abdomen is soft.   Musculoskeletal:         General: Normal range of motion.      Cervical back: Normal range of motion and neck supple.   Skin:     General: Skin is warm and dry.   Neurological:      Mental Status: She is alert and oriented to person, place, and time.   Psychiatric:         Behavior: Behavior normal.         Thought Content: Thought content normal.         Judgment: Judgment normal.

## 2025-01-28 NOTE — PATIENT INSTRUCTIONS
Amoxicillin 1 tab twice a day   Prednisone 10mg 4 tabs for 2 days, 3 tabs for 2 days, 2 tabs for 2 days, 1 tab for 2 days.

## 2025-02-02 PROBLEM — J45.909 REACTIVE AIRWAY DISEASE: Status: ACTIVE | Noted: 2025-02-02

## 2025-02-02 PROBLEM — J02.9 PHARYNGITIS: Status: ACTIVE | Noted: 2025-02-02

## 2025-02-03 NOTE — ASSESSMENT & PLAN NOTE
Start amoxicillin 1 tab twice a day  Rapid strep test negative  Orders:    amoxicillin (AMOXIL) 875 mg tablet; Take 1 tablet (875 mg total) by mouth 2 (two) times a day for 7 days    POCT rapid ANTIGEN strepA

## 2025-02-03 NOTE — ASSESSMENT & PLAN NOTE
Prednisone taper   Orders:    predniSONE 10 mg tablet; 4 tabs for 2 days, 3 tabs for 2 days, 2 tabs for 2 days and 1 tab for 2 days

## 2025-02-04 ENCOUNTER — EVALUATION (OUTPATIENT)
Dept: PHYSICAL THERAPY | Facility: CLINIC | Age: 20
End: 2025-02-04
Payer: COMMERCIAL

## 2025-02-04 DIAGNOSIS — M41.115 JUVENILE IDIOPATHIC SCOLIOSIS OF THORACOLUMBAR REGION: ICD-10-CM

## 2025-02-04 DIAGNOSIS — M54.50 CHRONIC BILATERAL LOW BACK PAIN WITHOUT SCIATICA: ICD-10-CM

## 2025-02-04 DIAGNOSIS — R29.3 POOR POSTURE: ICD-10-CM

## 2025-02-04 DIAGNOSIS — M54.9 MID BACK PAIN: Primary | ICD-10-CM

## 2025-02-04 DIAGNOSIS — G89.29 CHRONIC BILATERAL LOW BACK PAIN WITHOUT SCIATICA: ICD-10-CM

## 2025-02-04 PROCEDURE — 97162 PT EVAL MOD COMPLEX 30 MIN: CPT | Performed by: PHYSICAL THERAPIST

## 2025-02-04 PROCEDURE — 97110 THERAPEUTIC EXERCISES: CPT | Performed by: PHYSICAL THERAPIST

## 2025-02-11 ENCOUNTER — OFFICE VISIT (OUTPATIENT)
Dept: PHYSICAL THERAPY | Facility: CLINIC | Age: 20
End: 2025-02-11
Payer: COMMERCIAL

## 2025-02-11 DIAGNOSIS — R29.3 POOR POSTURE: ICD-10-CM

## 2025-02-11 DIAGNOSIS — M41.115 JUVENILE IDIOPATHIC SCOLIOSIS OF THORACOLUMBAR REGION: ICD-10-CM

## 2025-02-11 DIAGNOSIS — M54.9 MID BACK PAIN: Primary | ICD-10-CM

## 2025-02-11 DIAGNOSIS — G89.29 CHRONIC BILATERAL LOW BACK PAIN WITHOUT SCIATICA: ICD-10-CM

## 2025-02-11 DIAGNOSIS — M54.50 CHRONIC BILATERAL LOW BACK PAIN WITHOUT SCIATICA: ICD-10-CM

## 2025-02-11 PROCEDURE — 97110 THERAPEUTIC EXERCISES: CPT | Performed by: PHYSICAL THERAPIST

## 2025-02-11 PROCEDURE — 97112 NEUROMUSCULAR REEDUCATION: CPT | Performed by: PHYSICAL THERAPIST

## 2025-02-11 NOTE — PROGRESS NOTES
Daily Note     Today's date: 2025  Patient name: Dasha Da Silva  : 2005  MRN: [id number]  Referring provider: Allison Mccoy DO  Dx:   Encounter Diagnosis     ICD-10-CM    1. Mid back pain  M54.9       2. Chronic bilateral low back pain without sciatica  M54.50     G89.29       3. Poor posture  R29.3       4. Juvenile idiopathic scoliosis of thoracolumbar region  M41.115                      Subjective: She reports that she was sore after last visit.       Objective: See treatment diary below      Assessment: Tolerated treatment well; initiated POC with UBE forward and retrograde. Vcs provided on proper sequencing with exercises; progressed core strengthening with walk outs, initiated mid back and low back stretching. Mid back strengthening progressed. Reviewed HEP. Discussed probable DOMS post session with using modalities.  Concluded with quadruped PPT. She reports feeling slightly better post session.   Patient demonstrated fatigue post treatment and would benefit from continued PT      Plan: Continue per plan of care.        Daily Treatment Diary     Precautions: childhood scoliosis    FOTO Completed On:     POC Expires Reeval for Medicare to be completed  Unit Limit Auth Expiration Date PT/OT/STVisit Limit   2025 By visit NA NA 2025 30    Completed on visit: NA                   Auth Status DATE        Approved Visit # 1 2         29 28       MANUAL THERAPY         STM/TPR lumbar spine in prone  SMF       PA glides lumbar and thoracic                                             THERAPEUTIC EXERCISE HEP         Sidelying thoracic rotation stretch   5 sec x 10 ea       PPT in quadruped   5 sec x 5       [place] with holding PPT          Seated thoracic extension over half foam                    Seated HS stretch          Seated piriformis stretch                    Scap Retraction          Chin tucks 2 sec x 10 2 sec x 5 in supine 5 sec x 2x10 in supine        Mod pec stretch                              Prone T          Prone Y          Prone I          Prone W                    NEUROMUSCULAR REEDUCATION           TAC 5 sec x 10 5 sec x 3 in supine 10 sec; 2x10       TAC c walk outs (R out, L out, R in, L in)   0# 1x10       TAC c mod march (R up, R down, L up, L down)          TAC c Ball Squeezes   Blue: 10 sec x 10       Ball with Bridge          TAC c TB Hooklying Clamshells          TB Bridges          TAC c Dying Bug                    Quadruped TAC          Quadruped TAC c Alt UT          Quadruped TAC c Alt LEs          Quadruped TAC UE and alt LE                    Prone Plank          Sidelying Plank          Sidelying Plank with clamshells                              TB Scap Retraction GTB 2 sec x 10  GTB 2 sec x 15       TB B S' Ext GTB 2 sec x 10  GTB 2 sec x 15       TB B ER          TB Horiz ABD          TB Pallof Press                    Sciatic N glide                              THERAPEUTIC ACTIVITY          UBE (retrograde)   3 mins fwd/3 mins retro                                     GAIT TRAINING                                                  MODALITIES         MH extra layers in hooklying with there ex  15 mins to back and neck                    Skin checks performed pre and post application: intact

## 2025-02-12 ENCOUNTER — OFFICE VISIT (OUTPATIENT)
Dept: FAMILY MEDICINE CLINIC | Facility: CLINIC | Age: 20
End: 2025-02-12
Payer: COMMERCIAL

## 2025-02-12 VITALS
DIASTOLIC BLOOD PRESSURE: 60 MMHG | TEMPERATURE: 96.4 F | BODY MASS INDEX: 22.16 KG/M2 | OXYGEN SATURATION: 96 % | HEART RATE: 86 BPM | HEIGHT: 65 IN | SYSTOLIC BLOOD PRESSURE: 90 MMHG | WEIGHT: 133 LBS

## 2025-02-12 DIAGNOSIS — J02.9 PHARYNGITIS, UNSPECIFIED ETIOLOGY: Primary | ICD-10-CM

## 2025-02-12 LAB — S PYO AG THROAT QL: NEGATIVE

## 2025-02-12 PROCEDURE — 99213 OFFICE O/P EST LOW 20 MIN: CPT | Performed by: FAMILY MEDICINE

## 2025-02-12 PROCEDURE — 87880 STREP A ASSAY W/OPTIC: CPT | Performed by: FAMILY MEDICINE

## 2025-02-12 RX ORDER — CEFUROXIME AXETIL 250 MG/1
250 TABLET ORAL EVERY 12 HOURS SCHEDULED
Qty: 14 TABLET | Refills: 0 | Status: SHIPPED | OUTPATIENT
Start: 2025-02-12 | End: 2025-02-19

## 2025-02-12 RX ORDER — TRETINOIN 0.25 MG/G
CREAM TOPICAL
COMMUNITY
Start: 2024-12-10

## 2025-02-12 NOTE — ASSESSMENT & PLAN NOTE
Start cefuroxime 1 tab twice a day  Rapid strep negative   Orders:    cefuroxime (CEFTIN) 250 mg tablet; Take 1 tablet (250 mg total) by mouth every 12 (twelve) hours for 7 days    POCT rapid ANTIGEN strepA

## 2025-02-12 NOTE — LETTER
February 12, 2025     Patient: Dasha Da Silva  YOB: 2005  Date of Visit: 2/12/2025      To Whom it May Concern:    Dasha Da Silva is under my professional care. Dasha was seen in my office on 2/12/2025. Dasha may return to work 2/14/2025. Out 2/12/2025     If you have any questions or concerns, please don't hesitate to call.         Sincerely,          Allison Mccoy,         CC: No Recipients

## 2025-02-12 NOTE — PROGRESS NOTES
"Name: Dasha Da Silva      : 2005      MRN: [id number]  Encounter Provider: Allison Mccoy DO  Encounter Date: 2025   Encounter department: [facility]  :  Assessment & Plan  Pharyngitis, unspecified etiology  Start cefuroxime 1 tab twice a day  Rapid strep negative   Orders:    cefuroxime (CEFTIN) 250 mg tablet; Take 1 tablet (250 mg total) by mouth every 12 (twelve) hours for 7 days    POCT rapid ANTIGEN strepA           History of Present Illness   Pt here for sinus pressure, sore throat, ear pain, no fever.     Cough  Associated symptoms include ear pain and a sore throat. Pertinent negatives include no fever.     Review of Systems   Constitutional: Negative.  Negative for fatigue and fever.   HENT:  Positive for ear pain, sinus pressure and sore throat.    Eyes: Negative.    Respiratory:  Positive for cough.    Cardiovascular: Negative.    Gastrointestinal: Negative.    Endocrine: Negative.    Genitourinary: Negative.    Musculoskeletal: Negative.    Skin: Negative.    Allergic/Immunologic: Negative.    Neurological: Negative.    Psychiatric/Behavioral: Negative.         Objective   BP 90/60   Pulse 86   Temp (!) 96.4 °F (35.8 °C) (Tympanic)   Ht 5' 5\" (1.651 m)   Wt 60.3 kg (133 lb)   SpO2 96%   BMI 22.13 kg/m²      Physical Exam  Vitals and nursing note reviewed.   Constitutional:       General: She is not in acute distress.     Appearance: She is well-developed. She is not diaphoretic.   HENT:      Head: Normocephalic and atraumatic.      Right Ear: Tympanic membrane, ear canal and external ear normal.      Left Ear: Tympanic membrane, ear canal and external ear normal.      Nose: Mucosal edema and congestion present.      Right Sinus: Maxillary sinus tenderness and frontal sinus tenderness present.      Left Sinus: Maxillary sinus tenderness and frontal sinus tenderness present.      Mouth/Throat:      Pharynx: Posterior oropharyngeal erythema present. No oropharyngeal " exudate.   Eyes:      Conjunctiva/sclera: Conjunctivae normal.      Pupils: Pupils are equal, round, and reactive to light.   Neck:      Comments: Few anterior cervical nodes with tnederness  Cardiovascular:      Rate and Rhythm: Normal rate and regular rhythm.      Heart sounds: Normal heart sounds.   Pulmonary:      Effort: Pulmonary effort is normal. No respiratory distress.      Breath sounds: Normal breath sounds. No wheezing.   Abdominal:      General: Bowel sounds are normal.      Palpations: Abdomen is soft.   Musculoskeletal:         General: Normal range of motion.      Cervical back: Normal range of motion and neck supple.   Lymphadenopathy:      Cervical: Cervical adenopathy present.   Skin:     General: Skin is warm and dry.      Findings: No rash.   Neurological:      Mental Status: She is alert and oriented to person, place, and time.   Psychiatric:         Behavior: Behavior normal.         Thought Content: Thought content normal.         Judgment: Judgment normal.

## 2025-02-18 ENCOUNTER — OFFICE VISIT (OUTPATIENT)
Dept: PHYSICAL THERAPY | Facility: CLINIC | Age: 20
End: 2025-02-18
Payer: COMMERCIAL

## 2025-02-18 DIAGNOSIS — M41.115 JUVENILE IDIOPATHIC SCOLIOSIS OF THORACOLUMBAR REGION: ICD-10-CM

## 2025-02-18 DIAGNOSIS — M54.9 MID BACK PAIN: Primary | ICD-10-CM

## 2025-02-18 DIAGNOSIS — M54.50 CHRONIC BILATERAL LOW BACK PAIN WITHOUT SCIATICA: ICD-10-CM

## 2025-02-18 DIAGNOSIS — R29.3 POOR POSTURE: ICD-10-CM

## 2025-02-18 DIAGNOSIS — G89.29 CHRONIC BILATERAL LOW BACK PAIN WITHOUT SCIATICA: ICD-10-CM

## 2025-02-18 PROCEDURE — 97140 MANUAL THERAPY 1/> REGIONS: CPT

## 2025-02-18 PROCEDURE — 97110 THERAPEUTIC EXERCISES: CPT

## 2025-02-18 PROCEDURE — 97112 NEUROMUSCULAR REEDUCATION: CPT

## 2025-02-18 NOTE — PROGRESS NOTES
Daily Note     Today's date: 2025  Patient name: Dasha Da Silva  : 2005  MRN: [id number]  Referring provider: Allison Mccoy DO  Dx:   Encounter Diagnosis     ICD-10-CM    1. Mid back pain  M54.9       2. Chronic bilateral low back pain without sciatica  M54.50     G89.29       3. Poor posture  R29.3       4. Juvenile idiopathic scoliosis of thoracolumbar region  M41.115             Start Time: 0815  Stop Time: 0900  Total time in clinic (min): 45 minutes    Subjective: States neck feels better but T-S/ L-S is sore prior to start of session.      Objective: See treatment diary below      Assessment: Session initiated with MHP in supine during TE. Skin checks performed t/o. Improved thoracic mobility post manuals. No adverse effects from activities. Session ended with UBE for postural strength and endurance. Patient would benefit from continued PT.       Plan: Continue per plan of care.        Daily Treatment Diary     Precautions: childhood scoliosis    FOTO Completed On:     POC Expires Reeval for Medicare to be completed  Unit Limit Auth Expiration Date PT/OT/STVisit Limit   2025 By visit NA NA 2025 30    Completed on visit: NA                   Auth Status DATE       Approved Visit # 1 2 3       Remaining 29 28 27      MANUAL THERAPY         STM/TPR lumbar spine in prone  SMF LQ      PA glides lumbar and thoracic                                             THERAPEUTIC EXERCISE HEP         Sidelying thoracic rotation stretch   5 sec x 10 ea 5 sec x 10 ea  + post manuals x3 ea      PPT in quadruped   5 sec x 5 5 sec x 5      [place] with holding PPT          Seated thoracic extension over half foam                    Seated HS stretch          Seated piriformis stretch                    Scap Retraction          Chin tucks 2 sec x 10 2 sec x 5 in supine 5 sec x 2x10 in supine 5 sec x 2x10 in supine      Mod pec stretch                              Prone T           Prone Y          Prone I          Prone W                    NEUROMUSCULAR REEDUCATION           TAC 5 sec x 10 5 sec x 3 in supine 10 sec; 2x10 10 sec; 2x10      TAC c walk outs (R out, L out, R in, L in)   0# 1x10 0# 1x10      TAC c mod march (R up, R down, L up, L down)          TAC c Ball Squeezes   Blue: 10 sec x 10 Blue: 10 sec x 10      Ball with Bridge          TAC c TB Hooklying Clamshells          TB Bridges          TAC c Dying Bug                    Quadruped TAC          Quadruped TAC c Alt UT          Quadruped TAC c Alt LEs          Quadruped TAC UE and alt LE                    Prone Plank          Sidelying Plank          Sidelying Plank with clamshells                              TB Scap Retraction GTB 2 sec x 10  GTB 2 sec x 15 GTB 2 sec x 15      TB B S' Ext GTB 2 sec x 10  GTB 2 sec x 15 GTB 2 sec x 15      TB B ER          TB Horiz ABD          TB Pallof Press                    Sciatic N glide                              THERAPEUTIC ACTIVITY          UBE (retrograde)   3 mins fwd/3 mins retro 3 mins fwd/3 mins retro                                    GAIT TRAINING                                                  MODALITIES         MH extra layers in hooklying with there ex  15 mins to back and neck 15 min to back                   Skin checks performed pre and post application: intact

## 2025-02-24 ENCOUNTER — OFFICE VISIT (OUTPATIENT)
Dept: FAMILY MEDICINE CLINIC | Facility: CLINIC | Age: 20
End: 2025-02-24
Payer: COMMERCIAL

## 2025-02-24 VITALS
WEIGHT: 134 LBS | DIASTOLIC BLOOD PRESSURE: 72 MMHG | OXYGEN SATURATION: 96 % | TEMPERATURE: 101 F | SYSTOLIC BLOOD PRESSURE: 94 MMHG | BODY MASS INDEX: 22.33 KG/M2 | HEIGHT: 65 IN | HEART RATE: 110 BPM

## 2025-02-24 DIAGNOSIS — J10.1 INFLUENZA A: Primary | ICD-10-CM

## 2025-02-24 PROCEDURE — 99213 OFFICE O/P EST LOW 20 MIN: CPT

## 2025-02-24 RX ORDER — OSELTAMIVIR PHOSPHATE 75 MG/1
75 CAPSULE ORAL 2 TIMES DAILY
Qty: 10 CAPSULE | Refills: 0 | Status: SHIPPED | OUTPATIENT
Start: 2025-02-24 | End: 2025-03-01

## 2025-02-24 NOTE — LETTER
February 24, 2025     Patient: Dasha Da Silva  YOB: 2005  Date of Visit: 2/24/2025      To Whom it May Concern:    Dasha Da Silva is under my professional care. Dasha was seen in my office on 2/24/2025. Dasha may return to work on Wednesday, 2/26/25 .    If you have any questions or concerns, please don't hesitate to call.         Sincerely,          Anette Coley DO        CC: No Recipients

## 2025-02-24 NOTE — LETTER
February 24, 2025     Patient: Dasha Da Silva  YOB: 2005  Date of Visit: 2/24/2025      To Whom it May Concern:    Dasha Da Silva is under my professional care. Dasha was seen in my office on 2/24/2025. Dasha may return to school on Thursday, 2/27/25 .    If you have any questions or concerns, please don't hesitate to call.         Sincerely,          Anette Coley DO        CC: No Recipients

## 2025-02-24 NOTE — PROGRESS NOTES
"Name: Dasha Da Silva      : 2005      MRN: [id number]  Encounter Provider: Anette Coley DO  Encounter Date: 2025   Encounter department: [facility]  :  Assessment & Plan  Influenza A  -tamiflu 75mg BID x 5 days   -Supportive care: tylenol/ibuprofen prn pain/fever, maintain adequate hydration, rest, steamy showers/humidifier, honey for sore throat  -return to office if symptoms fail to improve or worsen  Orders:    oseltamivir (TAMIFLU) 75 mg capsule; Take 1 capsule (75 mg total) by mouth 2 (two) times a day for 5 days           History of Present Illness   Patient presents for acute sick visit. Started yesterday with fever, chills, myalgia, cough, congestion. Tested positive for flu A this morning.       Review of Systems   Constitutional:  Positive for fatigue and fever. Negative for chills.   HENT:  Positive for congestion. Negative for ear pain and sore throat.    Eyes:  Negative for pain and visual disturbance.   Respiratory:  Positive for cough. Negative for shortness of breath.    Cardiovascular:  Negative for chest pain and palpitations.   Gastrointestinal:  Negative for abdominal pain and vomiting.   Genitourinary:  Negative for dysuria and hematuria.   Musculoskeletal:  Positive for myalgias. Negative for arthralgias and back pain.   Skin:  Negative for color change and rash.   Neurological:  Negative for seizures and syncope.   All other systems reviewed and are negative.      Objective   BP 94/72   Pulse (!) 110   Temp (!) 101 °F (38.3 °C) (Tympanic)   Ht 5' 5\" (1.651 m)   Wt 60.8 kg (134 lb)   SpO2 96%   BMI 22.30 kg/m²      Physical Exam  Constitutional:       General: She is not in acute distress.     Appearance: Normal appearance. She is ill-appearing. She is not toxic-appearing.   HENT:      Head: Normocephalic and atraumatic.      Right Ear: Tympanic membrane, ear canal and external ear normal.      Left Ear: Tympanic membrane, ear canal and external ear " normal.      Nose: Nose normal.      Mouth/Throat:      Mouth: Mucous membranes are moist.      Pharynx: Oropharynx is clear. No oropharyngeal exudate or posterior oropharyngeal erythema.   Eyes:      Extraocular Movements: Extraocular movements intact.      Conjunctiva/sclera: Conjunctivae normal.      Pupils: Pupils are equal, round, and reactive to light.   Cardiovascular:      Rate and Rhythm: Normal rate and regular rhythm.      Heart sounds: Normal heart sounds. No murmur heard.  Pulmonary:      Effort: Pulmonary effort is normal. No respiratory distress.      Breath sounds: Normal breath sounds. No wheezing, rhonchi or rales.   Musculoskeletal:         General: Normal range of motion.      Cervical back: Normal range of motion. No rigidity or tenderness.   Lymphadenopathy:      Cervical: No cervical adenopathy.   Skin:     General: Skin is warm and dry.   Neurological:      General: No focal deficit present.      Mental Status: She is alert and oriented to person, place, and time.   Psychiatric:         Mood and Affect: Mood normal.         Behavior: Behavior normal.

## 2025-02-25 ENCOUNTER — APPOINTMENT (OUTPATIENT)
Dept: PHYSICAL THERAPY | Facility: CLINIC | Age: 20
End: 2025-02-25
Payer: COMMERCIAL

## 2025-03-04 ENCOUNTER — CLINICAL SUPPORT (OUTPATIENT)
Dept: FAMILY MEDICINE CLINIC | Facility: CLINIC | Age: 20
End: 2025-03-04
Payer: COMMERCIAL

## 2025-03-04 ENCOUNTER — OFFICE VISIT (OUTPATIENT)
Dept: PHYSICAL THERAPY | Facility: CLINIC | Age: 20
End: 2025-03-04
Payer: COMMERCIAL

## 2025-03-04 DIAGNOSIS — M41.115 JUVENILE IDIOPATHIC SCOLIOSIS OF THORACOLUMBAR REGION: ICD-10-CM

## 2025-03-04 DIAGNOSIS — M54.50 CHRONIC BILATERAL LOW BACK PAIN WITHOUT SCIATICA: ICD-10-CM

## 2025-03-04 DIAGNOSIS — M54.9 MID BACK PAIN: Primary | ICD-10-CM

## 2025-03-04 DIAGNOSIS — Z11.1 TUBERCULIN SKIN TEST ENCOUNTER: Primary | ICD-10-CM

## 2025-03-04 DIAGNOSIS — G89.29 CHRONIC BILATERAL LOW BACK PAIN WITHOUT SCIATICA: ICD-10-CM

## 2025-03-04 DIAGNOSIS — R29.3 POOR POSTURE: ICD-10-CM

## 2025-03-04 PROCEDURE — 86580 TB INTRADERMAL TEST: CPT | Performed by: FAMILY MEDICINE

## 2025-03-04 PROCEDURE — 97140 MANUAL THERAPY 1/> REGIONS: CPT

## 2025-03-04 PROCEDURE — 97110 THERAPEUTIC EXERCISES: CPT

## 2025-03-04 NOTE — PROGRESS NOTES
Daily Note     Today's date: 3/4/2025  Patient name: Dasha Da Silva  : 2005  MRN: [id number]  Referring provider: Allison Mccoy DO  Dx:   Encounter Diagnosis     ICD-10-CM    1. Mid back pain  M54.9       2. Chronic bilateral low back pain without sciatica  M54.50     G89.29       3. Poor posture  R29.3       4. Juvenile idiopathic scoliosis of thoracolumbar region  M41.115               Start Time: 815  Stop Time: 908  Total time in clinic (min): 53 minutes    Subjective: Notes that c-s is more sore than L-S/T-S. Notes that she did shoulder rolls, but forgot to do chin tucks.      Objective: See treatment diary below      Assessment: Session initiated with MHP to neck and back. Added LE + core/ pelvic stability activities with good tolerance. Cervical stiffness and discomfort abolished with MHP + chin tucks. Continued with STM to T-S/ L-S with improvements in mobility post. Patient would benefit from continued PT to improve function.       Plan: Continue per plan of care.        Daily Treatment Diary     Precautions: childhood scoliosis    FOTO Completed On:     POC Expires Reeval for Medicare to be completed  Unit Limit Auth Expiration Date PT/OT/STVisit Limit   2025 By visit ALEXANDRA MORRIS 2025 30    Completed on visit: ALEXANDRA                   Auth Status DATE 2/4 2/11 2/18 3/4     Approved Visit # 1 2 3 4      Remaining 29 28 27 26     MANUAL THERAPY         STM/TPR lumbar spine in prone  SMF LQ LQ     PA glides lumbar and thoracic                                             THERAPEUTIC EXERCISE HEP         Sidelying thoracic rotation stretch   5 sec x 10 ea 5 sec x 10 ea  + post manuals x3 ea 5 sec x 10 ea     PPT in quadruped   5 sec x 5 5 sec x 5 5 sec x 5     [place] with holding PPT          Seated thoracic extension over half foam                    Seated HS stretch          Seated piriformis stretch                    Scap Retraction          Chin tucks 2 sec x 10 2 sec x 5 in  "supine 5 sec x 2x10 in supine 5 sec x 2x10 in supine 5 sec x 2x10 in supine     Mod pec stretch                              Prone T          Prone Y          Prone I          Prone W                    NEUROMUSCULAR REEDUCATION           TAC 5 sec x 10 5 sec x 3 in supine 10 sec; 2x10 10 sec; 2x10 10 sec; 2x10     TAC c walk outs (R out, L out, R in, L in)   0# 1x10 0# 1x10 0# x10, x7     TAC c mod march (R up, R down, L up, L down)          TAC c Ball Squeezes   Blue: 10 sec x 10 Blue: 10 sec x 10 Blue: 10 sec x 10     Ball with Bridge          TAC c TB Hooklying Clamshells     OTB 10\" 2x10     TB Bridges     2x5, 5\"      TAC c Dying Bug                    Quadruped TAC          Quadruped TAC c Alt UT          Quadruped TAC c Alt LEs          Quadruped TAC UE and alt LE                    Prone Plank          Sidelying Plank          Sidelying Plank with clamshells                              TB Scap Retraction GTB 2 sec x 10  GTB 2 sec x 15 GTB 2 sec x 15 GTB 2 sec x 15     TB B S' Ext GTB 2 sec x 10  GTB 2 sec x 15 GTB 2 sec x 15 GTB 2 sec x 15     TB B ER          TB Horiz ABD          TB Pallof Press                    Sciatic N glide                              THERAPEUTIC ACTIVITY          UBE (retrograde)   3 mins fwd/3 mins retro 3 mins fwd/3 mins retro                                    GAIT TRAINING                                                  MODALITIES         MH extra layers in hooklying with there ex  15 mins to back and neck 15 min to back 15 min to back                  Skin checks performed pre and post application: intact         "

## 2025-03-07 ENCOUNTER — CLINICAL SUPPORT (OUTPATIENT)
Dept: FAMILY MEDICINE CLINIC | Facility: CLINIC | Age: 20
End: 2025-03-07

## 2025-03-07 DIAGNOSIS — Z11.1 ENCOUNTER FOR PPD SKIN TEST READING: Primary | ICD-10-CM

## 2025-03-11 ENCOUNTER — OFFICE VISIT (OUTPATIENT)
Dept: PHYSICAL THERAPY | Facility: CLINIC | Age: 20
End: 2025-03-11
Payer: COMMERCIAL

## 2025-03-11 DIAGNOSIS — R29.3 POOR POSTURE: ICD-10-CM

## 2025-03-11 DIAGNOSIS — M41.115 JUVENILE IDIOPATHIC SCOLIOSIS OF THORACOLUMBAR REGION: ICD-10-CM

## 2025-03-11 DIAGNOSIS — G89.29 CHRONIC BILATERAL LOW BACK PAIN WITHOUT SCIATICA: ICD-10-CM

## 2025-03-11 DIAGNOSIS — M54.50 CHRONIC BILATERAL LOW BACK PAIN WITHOUT SCIATICA: ICD-10-CM

## 2025-03-11 DIAGNOSIS — M54.9 MID BACK PAIN: Primary | ICD-10-CM

## 2025-03-11 PROCEDURE — 97112 NEUROMUSCULAR REEDUCATION: CPT | Performed by: PHYSICAL THERAPIST

## 2025-03-11 PROCEDURE — 97140 MANUAL THERAPY 1/> REGIONS: CPT | Performed by: PHYSICAL THERAPIST

## 2025-03-11 PROCEDURE — 97110 THERAPEUTIC EXERCISES: CPT | Performed by: PHYSICAL THERAPIST

## 2025-03-11 NOTE — PROGRESS NOTES
Daily Note     Today's date: 3/11/2025  Patient name: Dasha Da Silva  : 2005  MRN: [id number]  Referring provider: Allison Mccoy DO  Dx:   Encounter Diagnosis     ICD-10-CM    1. Mid back pain  M54.9       2. Chronic bilateral low back pain without sciatica  M54.50     G89.29       3. Poor posture  R29.3       4. Juvenile idiopathic scoliosis of thoracolumbar region  M41.115                      Subjective: She reports that she is having increased pain       Objective: See treatment diary below      Assessment: Tolerated treatment well; initiated POC with MH to back and neck while performing there ex. MT performed after receiving consent from pt; she was increased tone to B UT and along medial aspect borders f/b gentle PA glides in thoracic spine. Updated HEP and reviewed. She felt better post session.  Patient demonstrated fatigue post treatment and would benefit from continued PT      Plan: Continue per plan of care.      Daily Treatment Diary     Precautions: childhood scoliosis    FOTO Completed On:     POC Expires Reeval for Medicare to be completed  Unit Limit Auth Expiration Date PT/OT/STVisit Limit   2025 By visit NA NA 2025 30    Completed on visit: NA                   Auth Status DATE 2/4 2/11 2/18 3/4 3/11    Approved Visit # 1 2 3 4 5     Remaining 29 28 27 26 25    MANUAL THERAPY         STM/TPR lumbar spine in prone  SMF LQ LQ SMF ea    PA glides lumbar and thoracic     SMF focus on thoracic                                         THERAPEUTIC EXERCISE HEP         Sidelying thoracic rotation stretch   5 sec x 10 ea 5 sec x 10 ea  + post manuals x3 ea 5 sec x 10 ea 5 sec x 10 ea    PPT in quadruped   5 sec x 5 5 sec x 5 5 sec x 5 At home    [place] with holding PPT      5 sec x 10    Seated thoracic extension over half foam                    Seated HS stretch          Seated piriformis stretch                    Scap Retraction          Chin tucks 2 sec x 10 2 sec x 5  "in supine 5 sec x 2x10 in supine 5 sec x 2x10 in supine 5 sec x 2x10 in supine 5 sec x 20 in supine    Mod pec stretch                              Prone T          Prone Y          Prone I          Prone W                    NEUROMUSCULAR REEDUCATION           TAC 5 sec x 10 5 sec x 3 in supine 10 sec; 2x10 10 sec; 2x10 10 sec; 2x10 10 sec; 20    TAC c walk outs (R out, L out, R in, L in)   0# 1x10 0# 1x10 0# x10, x7     TAC c mod march (R up, R down, L up, L down)          TAC c Ball Squeezes   Blue: 10 sec x 10 Blue: 10 sec x 10 Blue: 10 sec x 10     Ball with Bridge          TAC c TB Hooklying Clamshells     OTB 10\" 2x10 OTB 10 sec; 2x10    TB Bridges     2x5, 5\"  OTB 5 sec; 2x5 ea    TAC c Dying Bug                    Quadruped TAC          Quadruped TAC c Alt UT          Quadruped TAC c Alt LEs          Quadruped TAC UE and alt LE                    Prone Plank          Sidelying Plank          Sidelying Plank with clamshells                              TB Scap Retraction GTB 2 sec x 10  GTB 2 sec x 15 GTB 2 sec x 15 GTB 2 sec x 15 NV    TB B S' Ext GTB 2 sec x 10  GTB 2 sec x 15 GTB 2 sec x 15 GTB 2 sec x 15 NV    TB B ER          TB Horiz ABD          TB Pallof Press                    Sciatic N glide                              THERAPEUTIC ACTIVITY          UBE (retrograde)   3 mins fwd/3 mins retro 3 mins fwd/3 mins retro                                    GAIT TRAINING                                                  MODALITIES         MH extra layers in hooklying with there ex  15 mins to back and neck 15 min to back 15 min to back 15 mins to back                 Skin checks performed pre and post application: intact           "

## 2025-03-18 ENCOUNTER — APPOINTMENT (OUTPATIENT)
Dept: PHYSICAL THERAPY | Facility: CLINIC | Age: 20
End: 2025-03-18
Payer: COMMERCIAL

## 2025-03-24 ENCOUNTER — TELEPHONE (OUTPATIENT)
Age: 20
End: 2025-03-24

## 2025-03-24 NOTE — TELEPHONE ENCOUNTER
Dasha needs a second round of PPD placed/read as a school requirement.     Please order and contact to advise once ready for scheduling.

## 2025-03-25 ENCOUNTER — OFFICE VISIT (OUTPATIENT)
Dept: PHYSICAL THERAPY | Facility: CLINIC | Age: 20
End: 2025-03-25
Payer: COMMERCIAL

## 2025-03-25 DIAGNOSIS — R29.3 POOR POSTURE: ICD-10-CM

## 2025-03-25 DIAGNOSIS — M54.9 MID BACK PAIN: Primary | ICD-10-CM

## 2025-03-25 DIAGNOSIS — G89.29 CHRONIC BILATERAL LOW BACK PAIN WITHOUT SCIATICA: ICD-10-CM

## 2025-03-25 DIAGNOSIS — M54.50 CHRONIC BILATERAL LOW BACK PAIN WITHOUT SCIATICA: ICD-10-CM

## 2025-03-25 DIAGNOSIS — M41.115 JUVENILE IDIOPATHIC SCOLIOSIS OF THORACOLUMBAR REGION: ICD-10-CM

## 2025-03-25 PROCEDURE — 97112 NEUROMUSCULAR REEDUCATION: CPT

## 2025-03-25 PROCEDURE — 97140 MANUAL THERAPY 1/> REGIONS: CPT

## 2025-03-25 PROCEDURE — 97110 THERAPEUTIC EXERCISES: CPT

## 2025-03-25 NOTE — PROGRESS NOTES
Daily Note     Today's date: 3/25/2025  Patient name: Dasha Da Silva  : 2005  MRN: [id number]  Referring provider: Allison Mccoy DO  Dx:   Encounter Diagnosis     ICD-10-CM    1. Mid back pain  M54.9       2. Chronic bilateral low back pain without sciatica  M54.50     G89.29       3. Poor posture  R29.3       4. Juvenile idiopathic scoliosis of thoracolumbar region  M41.115             Start Time: 09  Stop Time: 1030  Total time in clinic (min): 45 minutes    Subjective: Notes inc pain in R SO and c-s paraspinals.       Objective: See treatment diary below      Assessment: Session initiated in supine with MHP to c-s and L-S. Post core stability TE session progressed to manuals on c-s. Noted mod tightness at R SO and UT this visit. Continued with manuals to back with dec TTP post. Post session patient demonstrates improve T-S and c-s mobility. Patient advised to hold on banded UE TE for 48 hrs and perform unbanded. Patient also educated on self TPR for C-S. Patent demonstrated understanding. Patient would benefit from continued PT to improve function.      Plan: Continue per plan of care.      Daily Treatment Diary     Precautions: childhood scoliosis    FOTO Completed On:     POC Expires Reeval for Medicare to be completed  Unit Limit Auth Expiration Date PT/OT/STVisit Limit   2025 By visit NA NA 2025 30    Completed on visit: NA                   Auth Status DATE FOTO 2/11 2/18 3/4 3/11 3/25   Approved Visit #  2 3 4 5 6    Remaining  28 27 26 25 24   MANUAL THERAPY         STM/TPR lumbar spine in prone  SMF LQ LQ SMF ea STM lumbar and T-S   PA glides lumbar and thoracic     SMF focus on thoracic     C-S      SO c-s paraspinals + R UT                              THERAPEUTIC EXERCISE HEP         Sidelying thoracic rotation stretch   5 sec x 10 ea 5 sec x 10 ea  + post manuals x3 ea 5 sec x 10 ea 5 sec x 10 ea 5 sec x 10 ea   PPT in quadruped   5 sec x 5 5 sec x 5 5 sec x 5 At home   "  [place] with holding PPT      5 sec x 10 5 sec x 10   Seated thoracic extension over half foam                    Seated HS stretch          Seated piriformis stretch                    Scap Retraction          Chin tucks 2 sec x 10  5 sec x 2x10 in supine 5 sec x 2x10 in supine 5 sec x 2x10 in supine 5 sec x 20 in supine NV   Mod pec stretch                              Prone T          Prone Y          Prone I          Prone W                    NEUROMUSCULAR REEDUCATION           TAC 5 sec x 10  10 sec; 2x10 10 sec; 2x10 10 sec; 2x10 10 sec; 20 10 sec; 20   TAC c walk outs (R out, L out, R in, L in)   0# 1x10 0# 1x10 0# x10, x7     TAC c mod march (R up, R down, L up, L down)          TAC c Ball Squeezes   Blue: 10 sec x 10 Blue: 10 sec x 10 Blue: 10 sec x 10     Ball with Bridge          TAC c TB Hooklying Clamshells     OTB 10\" 2x10 OTB 10 sec; 2x10 OTB 10 sec; 2x10   TB Bridges     2x5, 5\"  OTB 5 sec; 2x5 ea OTB 10 sec; 2x10   TAC c Dying Bug                    Quadruped TAC          Quadruped TAC c Alt UT          Quadruped TAC c Alt LEs          Quadruped TAC UE and alt LE                    Prone Plank          Sidelying Plank          Sidelying Plank with clamshells                              TB Scap Retraction GTB 2 sec x 10  GTB 2 sec x 15 GTB 2 sec x 15 GTB 2 sec x 15 NV    TB B S' Ext GTB 2 sec x 10  GTB 2 sec x 15 GTB 2 sec x 15 GTB 2 sec x 15 NV    TB B ER          TB Horiz ABD          TB Pallof Press                    Sciatic N glide                              THERAPEUTIC ACTIVITY          UBE (retrograde)   3 mins fwd/3 mins retro 3 mins fwd/3 mins retro                                    GAIT TRAINING                                                  MODALITIES         MH extra layers in hooklying with there ex  15 mins to back and neck 15 min to back 15 min to back 15 mins to back                 Skin checks performed pre and post application: intact           "

## 2025-04-01 ENCOUNTER — OFFICE VISIT (OUTPATIENT)
Dept: PHYSICAL THERAPY | Facility: CLINIC | Age: 20
End: 2025-04-01
Payer: COMMERCIAL

## 2025-04-01 DIAGNOSIS — G89.29 CHRONIC BILATERAL LOW BACK PAIN WITHOUT SCIATICA: ICD-10-CM

## 2025-04-01 DIAGNOSIS — M54.50 CHRONIC BILATERAL LOW BACK PAIN WITHOUT SCIATICA: ICD-10-CM

## 2025-04-01 DIAGNOSIS — R29.3 POOR POSTURE: ICD-10-CM

## 2025-04-01 DIAGNOSIS — M54.9 MID BACK PAIN: Primary | ICD-10-CM

## 2025-04-01 DIAGNOSIS — M41.115 JUVENILE IDIOPATHIC SCOLIOSIS OF THORACOLUMBAR REGION: ICD-10-CM

## 2025-04-01 PROCEDURE — 97110 THERAPEUTIC EXERCISES: CPT

## 2025-04-01 PROCEDURE — 97140 MANUAL THERAPY 1/> REGIONS: CPT

## 2025-04-01 NOTE — PROGRESS NOTES
"Daily Note     Today's date: 2025  Patient name: Dasha Da Silva  : 2005  MRN: [id number]  Referring provider: Allison Mccoy DO  Dx:   Encounter Diagnosis     ICD-10-CM    1. Mid back pain  M54.9       2. Chronic bilateral low back pain without sciatica  M54.50     G89.29       3. Poor posture  R29.3       4. Juvenile idiopathic scoliosis of thoracolumbar region  M41.115               Start Time: 945  Stop Time: 1030  Total time in clinic (min): 45 minutes    Subjective: Continued with R SO pain, but does note self TPR helped somewhat. Notes that L-S/ T-S is mod improved.       Objective: See treatment diary below      Assessment: Continued with MHP to L-S and C-S prior to start of session. Added prone TE this session for mid trap strengthening and improved postural stability with good tolerance. Session ended with T-S extension. Patient would benefit from continued PT to improve function.       Plan: Continue per plan of care.      Daily Treatment Diary     Precautions: childhood scoliosis    FOTO Completed On:     POC Expires Reeval for Medicare to be completed  Unit Limit Auth Expiration Date PT/OT/STVisit Limit   2025 By visit NA NA 2025 30    Completed on visit: NA                   Auth Status DATE FOTO 2/18 3/4 3/11 3/25 4/1   Approved Visit #  3 4 5 6 7    Remaining  27 26 25 24 23   MANUAL THERAPY         STM/TPR lumbar spine in prone  LQ LQ SMF ea STM lumbar and T-S PRN   PA glides lumbar and thoracic    SMF focus on thoracic      C-S     SO c-s paraspinals + R UT SO c-s paraspinals + R UT                              THERAPEUTIC EXERCISE HEP         Sidelying thoracic rotation stretch   5 sec x 10 ea  + post manuals x3 ea 5 sec x 10 ea 5 sec x 10 ea 5 sec x 10 ea 5 sec x 10 ea   PPT in quadruped   5 sec x 5 5 sec x 5 At home     [place] with holding PPT     5 sec x 10 5 sec x 10 5 sec x 20   Seated thoracic extension over half foam       Hand behind head 10, 5\"         " "    Seated HS stretch          Seated piriformis stretch                    Scap Retraction          Chin tucks 2 sec x 10  5 sec x 2x10 in supine 5 sec x 2x10 in supine 5 sec x 20 in supine NV 5 sec x 20 in supine   Mod pec stretch                           10, 3\"   Prone T          Prone Y          Prone I       10, 3\"   Prone W                    NEUROMUSCULAR REEDUCATION           TAC 5 sec x 10  10 sec; 2x10 10 sec; 2x10 10 sec; 20 10 sec; 20 10\"x10   TAC c walk outs (R out, L out, R in, L in)   0# 1x10 0# x10, x7      TAC c mod march (R up, R down, L up, L down)          TAC c Ball Squeezes   Blue: 10 sec x 10 Blue: 10 sec x 10      Ball with Bridge          TAC c TB Hooklying Clamshells    OTB 10\" 2x10 OTB 10 sec; 2x10 OTB 10 sec; 2x10 NV   TB Bridges    2x5, 5\"  OTB 5 sec; 2x5 ea OTB 10 sec; 2x10 5 sec x 10   TAC c Dying Bug                    Quadruped TAC          Quadruped TAC c Alt UT          Quadruped TAC c Alt LEs          Quadruped TAC UE and alt LE                    Prone Plank          Sidelying Plank          Sidelying Plank with clamshells                              TB Scap Retraction GTB 2 sec x 10  GTB 2 sec x 15 GTB 2 sec x 15 NV  NV   TB B S' Ext GTB 2 sec x 10  GTB 2 sec x 15 GTB 2 sec x 15 NV  NV   TB B ER          TB Horiz ABD          TB Pallof Press                    Sciatic N glide                              THERAPEUTIC ACTIVITY          UBE (retrograde)   3 mins fwd/3 mins retro                                     GAIT TRAINING                                                  MODALITIES         MH extra layers in hooklying with there ex  15 min to back 15 min to back 15 mins to back  15 mins to back                Skin checks performed pre and post application: intact           "

## 2025-04-05 DIAGNOSIS — F41.9 ANXIETY: ICD-10-CM

## 2025-04-06 RX ORDER — BUSPIRONE HYDROCHLORIDE 10 MG/1
10 TABLET ORAL 3 TIMES DAILY PRN
Qty: 270 TABLET | Refills: 1 | Status: SHIPPED | OUTPATIENT
Start: 2025-04-06

## 2025-04-08 ENCOUNTER — OFFICE VISIT (OUTPATIENT)
Dept: PHYSICAL THERAPY | Facility: CLINIC | Age: 20
End: 2025-04-08
Payer: COMMERCIAL

## 2025-04-08 DIAGNOSIS — R29.3 POOR POSTURE: ICD-10-CM

## 2025-04-08 DIAGNOSIS — M54.9 MID BACK PAIN: Primary | ICD-10-CM

## 2025-04-08 DIAGNOSIS — M41.115 JUVENILE IDIOPATHIC SCOLIOSIS OF THORACOLUMBAR REGION: ICD-10-CM

## 2025-04-08 DIAGNOSIS — G89.29 CHRONIC BILATERAL LOW BACK PAIN WITHOUT SCIATICA: ICD-10-CM

## 2025-04-08 DIAGNOSIS — M54.50 CHRONIC BILATERAL LOW BACK PAIN WITHOUT SCIATICA: ICD-10-CM

## 2025-04-08 PROCEDURE — 97140 MANUAL THERAPY 1/> REGIONS: CPT

## 2025-04-08 PROCEDURE — 97112 NEUROMUSCULAR REEDUCATION: CPT

## 2025-04-08 PROCEDURE — 97110 THERAPEUTIC EXERCISES: CPT

## 2025-04-08 NOTE — PROGRESS NOTES
"Daily Note     Today's date: 2025  Patient name: Dasha Da Silva  : 2005  MRN: [id number]  Referring provider: Allison Mccoy DO  Dx:   Encounter Diagnosis     ICD-10-CM    1. Mid back pain  M54.9       2. Poor posture  R29.3       3. Chronic bilateral low back pain without sciatica  M54.50     G89.29       4. Juvenile idiopathic scoliosis of thoracolumbar region  M41.115           Start Time: 09  Stop Time: 1030  Total time in clinic (min): 45 minutes    Subjective: Notes she had no limitations with activities 2* to her c-s this week. Some tenderness in L-S L>R.       Objective: See treatment diary below      Assessment: Tolerated treatment well. Patient demonstrated fatigue post treatment and would benefit from continued PT. Focused on postural exercises this visit with good tolerance. Inc core fatigue at end of session demonstrated by activation of L-S paraspinals.       Plan: Continue per plan of care.      Daily Treatment Diary     Precautions: childhood scoliosis    FOTO Completed On: 24    POC Expires Reeval for Medicare to be completed  Unit Limit Auth Expiration Date PT/OT/STVisit Limit   2025 By visit NA NA 2025 30    Completed on visit: NA                   Auth Status DATE FOTO  3/4 3/11 3/25 4/1   Approved Visit # 8  4 5 6 7    Remaining 22  26 25 24 23   MANUAL THERAPY         STM/TPR lumbar spine in prone LQ  LQ SMF ea STM lumbar and T-S PRN   PA glides lumbar and thoracic    SMF focus on thoracic      C-S PRN    SO c-s paraspinals + R UT SO c-s paraspinals + R UT                              THERAPEUTIC EXERCISE HEP         Sidelying thoracic rotation stretch  5 sec x 10 ea  5 sec x 10 ea 5 sec x 10 ea 5 sec x 10 ea 5 sec x 10 ea   PPT in quadruped    5 sec x 5 At home     [place] with holding PPT  5 sec x 20   5 sec x 10 5 sec x 10 5 sec x 20   Seated thoracic extension over half foam       Hand behind head 10, 5\"             Seated HS stretch          Seated " "piriformis stretch                    Scap Retraction          Chin tucks 2 sec x 10 W/ standing activities  5 sec x 2x10 in supine 5 sec x 20 in supine NV 5 sec x 20 in supine   Mod pec stretch                           10, 3\"   Prone T  10, 3\"        Prone Y          Prone I  10, 3\"     10, 3\"   Prone W                    NEUROMUSCULAR REEDUCATION           TAC 5 sec x 10 W/ activities  10 sec; 2x10 10 sec; 20 10 sec; 20 10\"x10   TAC c walk outs (R out, L out, R in, L in)    0# x10, x7      TAC c mod march (R up, R down, L up, L down)          TAC c Ball Squeezes    Blue: 10 sec x 10      Ball with Bridge          TAC c TB Hooklying Clamshells  Gtb 5\" 2x10  OTB 10\" 2x10 OTB 10 sec; 2x10 OTB 10 sec; 2x10 NV   TB Bridges  GTB 5\"x10  2x5, 5\"  OTB 5 sec; 2x5 ea OTB 10 sec; 2x10 5 sec x 10   TAC c Dying Bug                    Quadruped TAC          Quadruped TAC c Alt UT          Quadruped TAC c Alt LEs          Quadruped TAC UE and alt LE                    Prone Plank          Sidelying Plank          Sidelying Plank with clamshells                              TB Scap Retraction GTB 2 sec x 10 GTB 5 sec x 10  GTB 2 sec x 15 NV  NV   TB B S' Ext GTB 2 sec x 10 GTB 5 sec x 10  GTB 2 sec x 15 NV  NV   TB B ER  2x5 ea        TB Horiz ABD          TB Pallof Press                    Sciatic N glide                              THERAPEUTIC ACTIVITY          UBE (retrograde)                                        GAIT TRAINING                                                  MODALITIES         MH extra layers in hooklying with there ex   15 min to back 15 mins to back  15 mins to back                Skin checks performed pre and post application: intact             "

## 2025-04-15 ENCOUNTER — OFFICE VISIT (OUTPATIENT)
Dept: PHYSICAL THERAPY | Facility: CLINIC | Age: 20
End: 2025-04-15
Payer: COMMERCIAL

## 2025-04-15 DIAGNOSIS — M41.115 JUVENILE IDIOPATHIC SCOLIOSIS OF THORACOLUMBAR REGION: ICD-10-CM

## 2025-04-15 DIAGNOSIS — M54.9 MID BACK PAIN: Primary | ICD-10-CM

## 2025-04-15 DIAGNOSIS — G89.29 CHRONIC BILATERAL LOW BACK PAIN WITHOUT SCIATICA: ICD-10-CM

## 2025-04-15 DIAGNOSIS — M54.50 CHRONIC BILATERAL LOW BACK PAIN WITHOUT SCIATICA: ICD-10-CM

## 2025-04-15 DIAGNOSIS — R29.3 POOR POSTURE: ICD-10-CM

## 2025-04-15 PROCEDURE — 97112 NEUROMUSCULAR REEDUCATION: CPT | Performed by: PHYSICAL THERAPIST

## 2025-04-15 PROCEDURE — 97140 MANUAL THERAPY 1/> REGIONS: CPT | Performed by: PHYSICAL THERAPIST

## 2025-04-15 PROCEDURE — 97110 THERAPEUTIC EXERCISES: CPT | Performed by: PHYSICAL THERAPIST

## 2025-04-15 NOTE — PROGRESS NOTES
PT Evaluation     Today's date: 4/15/2025  Patient name: Dasha Da Silva  : 2005  MRN: [id number]  Referring provider: Allison Mccoy DO  Dx:   Encounter Diagnosis     ICD-10-CM    1. Mid back pain  M54.9       2. Chronic bilateral low back pain without sciatica  M54.50     G89.29       3. Poor posture  R29.3       4. Juvenile idiopathic scoliosis of thoracolumbar region  M41.115                      Assessment  Impairments: abnormal muscle firing, abnormal muscle tone, abnormal or restricted ROM, abnormal movement, activity intolerance, impaired physical strength, lacks appropriate home exercise program, pain with function, scapular dyskinesis, weight-bearing intolerance, poor posture, poor body mechanics and endurance  Symptom irritability: moderate    Assessment details: Dasha Da Silva is a 19 y.o. female presenting to outpatient physical therapy at [facility] with complaints of chronic mid back, low back, and bilateral hip pain. She reports being diagnosed with scoliosis as a child. Overall she is gradually improving with strength and flexibility; however, she continues to present with decreased range of motion, decreased strength, limited flexibility, poor postural awareness, poor body mechanics, altered gait pattern, poor balance, decreased tolerance to activity and decreased functional mobility due to mid back and low back pain as a result of her scoliosis.  She would benefit from skilled PT services in order to address these deficits and reach maximum level of function.  Thank you for the referral!   Understanding of Dx/Px/POC: good     Prognosis: good    Goals  STGs (in 4 weeks):  1. Pt will report having at least a 50% improvement since I.E. - Goal met  2. Pt will report having at most a 2/10 pain level with functional mobility. - Progressing towards   3. Pt will have at least 4-4+/5 MMT grade throughout mid back, core, and BLEs. - Progressing towards     LTGs (in 12 weeks):  1. Pt will  report having at least a 75% improvement since I.E - Goal met  2. Pt will be independent with HEP. - Progressing towards   3. Pt will be able to sit and stand for prolonged periods with good postural awareness with less reports of pain for work and in class. - Progressing towards    Plan  Patient would benefit from: home program, skilled physical therapy and PT eval  Planned modality interventions: unattended electrical stimulation and TENS    Planned therapy interventions: joint mobilization, kinesiology taping, activity modification, manual therapy, neuromuscular re-education, nerve gliding, balance, self care, strengthening, stretching, flexibility, therapeutic activities, therapeutic training, therapeutic exercise, gait training and home exercise program    Frequency: 2x week  Plan of Care beginning date: 4/15/2025  Plan of Care expiration date: 7/8/2025  Treatment plan discussed with: patient      Subjective Evaluation    History of Present Illness    RE 4/15/2025: Patient reports that her neck is stiff today. She does see the improvement with being able to sit longer with less reports of pain. Also, she has noticed improvements with prolonged standing with less pain.     Mechanism of injury: Pt is a 19 year old female who presents with chronic progressive pain that increased approximately 8 months ago. She reports having pain that increases in low back and hips while at work with prolonged sitting and standing. She has a sit-stand desk that she uses. She reports that she has to twist her low back to relief the pressure. If she performs a posterior pelvic tilt, she has relief in her low back while sitting. She also reports having increased pain when lying on her back in bed so she has to lie on her sides. 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 Patient reports that she was diagnoses with scoliosis when she was a child. No previous PT. Now referred to skilled OP PT.     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  Quality: dull ache and pressure  Relieving factors: heat and medications (Tylenol)  Aggravating factors: sitting and standing  Progression: gradually improving    Hand dominance: right      Diagnostic Tests  X-ray: abnormal      Objective      Posture: Lumbar lordosis is increased in standing. There is no lateral shift. Her spine is curved to the right (reverse C curve) with no observed rib hump however R scapular is depressed.        Lumbar AROM limitations:  (*=  Pain)  Lumbar flexion: Min loss  Lumbar extension: Mod loss  R side glide:  Mod loss  L side glide:  Mod loss    Mechanical Asessment: pre-test symtpoms include NT  Repeated Extension in Standing (CHELE): NT  Repeated Extension in Lying (REIL):  NT    Strength:     Right  Left  UT   3+/5  3+/5  MT   3+/5  3+/5  LT   3/5  3+/5      Core strength: Upper abs: 3/5; lower abs: 3-/5     Right  Left  Hip flexion:  4/5  4/5  Knee ext  4/5  4/5  Ankle DF  4/5  4/5  Great toe ext  4/5  4/5  Ankle PF  4/5  4/5  Knee flex  4/5  4/5      Hip abduction  3+/5  3+/5  Hip adduction  4/5  4/5  Hip extension  4-/5  4-/5    Joint mobility: decreased PA glides in thoracic and lumbar however mm guarding    Tenderness/Palpation: TTP along SP of thoracic and lumbar    Sensation: intact throughout light touch with BLEs     Flexibility: (+) SLR (L worse than R);     Function: Pt squats with increased forward trunk flexion         Daily Treatment Diary     Precautions: childhood scoliosis    FOTO Completed On: 24    POC Expires Reeval for Medicare to be completed  Unit Limit Auth Expiration Date PT/OT/STVisit Limit   2025 By  "visit NA NA 12/31/2025 30    Completed on visit: NA                   FOTO  DONE                            Auth Status DATE FOTO 4/15  3/11 3/25 4/1   Approved Visit # 8 9  5 6 7    Remaining 22 21  25 24 23   MANUAL THERAPY         STM/TPR lumbar spine in prone LQ SMF   SMF ea STM lumbar and T-S PRN   PA glides lumbar and thoracic    SMF focus on thoracic      C-S PRN SMF (SOR, TPR B UT/LS,    SO c-s paraspinals + R UT SO c-s paraspinals + R UT                              THERAPEUTIC EXERCISE HEP         Sidelying thoracic rotation stretch  5 sec x 10 ea 5 sec x 10 ea  5 sec x 10 ea 5 sec x 10 ea 5 sec x 10 ea   PPT in quadruped     At home     [place] with holding PPT  5 sec x 20 5 sec x 10  5 sec x 10 5 sec x 10 5 sec x 20   Seated thoracic extension over half foam       Hand behind head 10, 5\"             Seated HS stretch          Seated piriformis stretch                    Scap Retraction          Chin tucks 2 sec x 10 W/ standing activities   5 sec x 20 in supine NV 5 sec x 20 in supine   Mod pec stretch          SNAGs Ext   Instructed on for HEP              10, 3\"   Prone T  10, 3\" NV       Prone Y          Prone I  10, 3\" NV    10, 3\"   Prone W                    NEUROMUSCULAR REEDUCATION           TAC 5 sec x 10 W/ activities   10 sec; 20 10 sec; 20 10\"x10   TAC c walk outs (R out, L out, R in, L in)          TAC c mod march (R up, R down, L up, L down)          TAC c Ball Squeezes          Ball with Bridge          TAC c TB Hooklying Clamshells  Gtb 5\" 2x10 GTB 5 sec x 20  OTB 10 sec; 2x10 OTB 10 sec; 2x10 NV   TB Bridges  GTB 5\"x10 GTB 5 sec x 10  OTB 5 sec; 2x5 ea OTB 10 sec; 2x10 5 sec x 10   TAC c Dying Bug                    Quadruped TAC          Quadruped TAC c Alt UT          Quadruped TAC c Alt LEs          Quadruped TAC UE and alt LE                    Prone Plank          Sidelying Plank          Sidelying Plank with clamshells                              TB Scap Retraction GTB 2 " sec x 10 GTB 5 sec x 10 NV  NV  NV   TB B S' Ext GTB 2 sec x 10 GTB 5 sec x 10 NV  NV  NV   TB B ER  2x5 ea NV       TB Horiz ABD          TB Pallof Press                    Sciatic N glide                              THERAPEUTIC ACTIVITY          UBE (retrograde)          Elliptical    4 mins                           GAIT TRAINING                                                  MODALITIES         MH extra layers in hooklying with there ex  10 mins with there ex  15 mins to back  15 mins to back                Skin checks performed pre and post application: intact

## 2025-04-22 ENCOUNTER — OFFICE VISIT (OUTPATIENT)
Dept: PHYSICAL THERAPY | Facility: CLINIC | Age: 20
End: 2025-04-22
Payer: COMMERCIAL

## 2025-04-22 DIAGNOSIS — M41.115 JUVENILE IDIOPATHIC SCOLIOSIS OF THORACOLUMBAR REGION: ICD-10-CM

## 2025-04-22 DIAGNOSIS — M54.9 MID BACK PAIN: Primary | ICD-10-CM

## 2025-04-22 DIAGNOSIS — M54.50 CHRONIC BILATERAL LOW BACK PAIN WITHOUT SCIATICA: ICD-10-CM

## 2025-04-22 DIAGNOSIS — R29.3 POOR POSTURE: ICD-10-CM

## 2025-04-22 DIAGNOSIS — G89.29 CHRONIC BILATERAL LOW BACK PAIN WITHOUT SCIATICA: ICD-10-CM

## 2025-04-22 PROCEDURE — 97110 THERAPEUTIC EXERCISES: CPT

## 2025-04-22 PROCEDURE — 97140 MANUAL THERAPY 1/> REGIONS: CPT

## 2025-04-22 NOTE — PROGRESS NOTES
"Daily Note     Today's date: 2025  Patient name: Dasha Da Silva  : 2005  MRN: [id number]  Referring provider: Allison Mccoy DO  Dx:   Encounter Diagnosis     ICD-10-CM    1. Mid back pain  M54.9       2. Poor posture  R29.3       3. Chronic bilateral low back pain without sciatica  M54.50     G89.29       4. Juvenile idiopathic scoliosis of thoracolumbar region  M41.115           Start Time: 0800  Stop Time: 0845  Total time in clinic (min): 45 minutes    Subjective: In L-S soreness from working in the garden and moving heavy bird bath. Patient denies c-s restriction or soreness prior to start of session.       Objective: See treatment diary below      Assessment: Focused session on core stability, and lumbopelvic mobility. Dec soreness in mid back and low back post manuals. Patient would benefit from continued PT to improve function.      Plan: Continue per plan of care.      Daily Treatment Diary     Precautions: childhood scoliosis    FOTO Completed On: 24    POC Expires Reeval for Medicare to be completed  Unit Limit Auth Expiration Date PT/OT/STVisit Limit   2025 By visit NA NA 2025 30    Completed on visit: NA                   FOTO  DONE  DONE                          Auth Status DATE FOTO 4/15 4/22  3/25 4/1   Approved Visit # 8 9 10  6 7    Remaining 22 21 20  24 23   MANUAL THERAPY         STM/TPR lumbar spine in prone LQ SMF  LQ  STM lumbar and T-S PRN   PA glides lumbar and thoracic         C-S PRN SMF (SOR, TPR B UT/LS,  PRN  SO c-s paraspinals + R UT SO c-s paraspinals + R UT                              THERAPEUTIC EXERCISE HEP         Sidelying thoracic rotation stretch  5 sec x 10 ea 5 sec x 10 ea 5 sec x 10 ea  5 sec x 10 ea 5 sec x 10 ea   PPT in quadruped          [place] with holding PPT  5 sec x 20 5 sec x 10 5 sec x 10  5 sec x 10 5 sec x 20   Seated thoracic extension over half foam       Hand behind head 10, 5\"             Seated HS stretch        " "  Seated piriformis stretch                    Scap Retraction          Chin tucks 2 sec x 10 W/ standing activities    NV 5 sec x 20 in supine   Mod pec stretch          SNAGs Ext   Instructed on for HEP              10, 3\"   Prone T  10, 3\" NV 10,  3\" ea      Prone Y          Prone I  10, 3\" NV 10,  3\" ea   10, 3\"   Prone W                    NEUROMUSCULAR REEDUCATION           TAC 5 sec x 10 W/ activities    10 sec; 20 10\"x10   TAC c walk outs (R out, L out, R in, L in)          TAC c mod march (R up, R down, L up, L down)          TAC c Ball Squeezes          Ball with Bridge          TAC c TB Hooklying Clamshells  Gtb 5\" 2x10 GTB 5 sec x 20 GTB 5 sec x 20  OTB 10 sec; 2x10 NV   TB Bridges  GTB 5\"x10 GTB 5 sec x 10 GTB 5 sec x 10  OTB 10 sec; 2x10 5 sec x 10   TAC c Dying Bug                    Quadruped TAC          Quadruped TAC c Alt UT          Quadruped TAC c Alt LEs          Quadruped TAC UE and alt LE                    Prone Plank          Sidelying Plank          Sidelying Plank with clamshells                              TB Scap Retraction GTB 2 sec x 10 GTB 5 sec x 10 NV NV   NV   TB B S' Ext GTB 2 sec x 10 GTB 5 sec x 10 NV NV   NV   TB B ER  2x5 ea NV NV      TB Horiz ABD          TB Pallof Press                    Sciatic N glide                              THERAPEUTIC ACTIVITY          UBE (retrograde)          Elliptical    4 mins                           GAIT TRAINING                                                  MODALITIES         MH extra layers in hooklying with there ex  10 mins with there ex    15 mins to back                Skin checks performed pre and post application: intact                  "

## 2025-04-29 ENCOUNTER — EVALUATION (OUTPATIENT)
Dept: PHYSICAL THERAPY | Facility: CLINIC | Age: 20
End: 2025-04-29
Payer: COMMERCIAL

## 2025-04-29 DIAGNOSIS — M54.9 MID BACK PAIN: Primary | ICD-10-CM

## 2025-04-29 DIAGNOSIS — R29.3 POOR POSTURE: ICD-10-CM

## 2025-04-29 PROCEDURE — 97110 THERAPEUTIC EXERCISES: CPT

## 2025-04-29 PROCEDURE — 97112 NEUROMUSCULAR REEDUCATION: CPT

## 2025-04-29 NOTE — HOME EXERCISE EDUCATION
Program_ID:[id number]   Access Code: [email or web address]  URL: [email or web address]  Date: 04-  Prepared By: Triny Rice    Program Notes      Exercises      - Chicken Wing - 1 x daily - 7 x weekly - 3 sets - 10 reps

## 2025-04-29 NOTE — PROGRESS NOTES
"Daily Note     Today's date: 2025  Patient name: Dasha Da Silva  : 2005  MRN: [id number]  Referring provider: Allison Mccoy DO  Dx:   Encounter Diagnosis     ICD-10-CM    1. Mid back pain  M54.9       2. Poor posture  R29.3           Start Time: 0830  Stop Time: 0915  Total time in clinic (min): 45 minutes    Subjective: Patient states neck and back feel great.      Objective: See treatment diary below      Assessment: Progressed T-S strengthening this visit. Held manuals to trial mobility post visit w/o dependence on manuals. Issued standing T-S mobility for home this visit document in HEP section of noted at end of chart. Patient demonstrated HEP to therapist. Self TPR for T-S in standing taught and demonstrated. All questions answered to patient satisfaction. UBE performed at end of session for further T-S strengthening and endurance.       Plan: Continue per plan of care.      Daily Treatment Diary     Precautions: childhood scoliosis    FOTO Completed On: 24    POC Expires Reeval for Medicare to be completed  Unit Limit Auth Expiration Date PT/OT/STVisit Limit   2025 By visit NA NA 2025 30    Completed on visit: NA                   FOTO  DONE  DONE                          Auth Status DATE FOTO 4/15 4/22   4/1   Approved Visit # 8 9 10   7    Remaining 22 21 20 19  23   MANUAL THERAPY         STM/TPR lumbar spine in prone LQ SMF  LQ PRN  PRN   PA glides lumbar and thoracic         C-S PRN SMF (SOR, TPR B UT/LS,  PRN PRN  SO c-s paraspinals + R UT                              THERAPEUTIC EXERCISE HEP         Sidelying thoracic rotation stretch  5 sec x 10 ea 5 sec x 10 ea 5 sec x 10 ea 5 sec x 10 ea  5 sec x 10 ea   PPT in quadruped          Hand [place] with holding PPT  5 sec x 20 5 sec x 10 5 sec x 10 5 sec x 10  5 sec x 20   Seated thoracic extension over half foam       Hand behind head 10, 5\"             Seated HS stretch          Seated piriformis stretch              " "      Scap Retraction          Chin tucks 2 sec x 10 W/ standing activities     5 sec x 20 in supine   Mod pec stretch          SNAGs Ext   Instructed on for HEP              10, 3\"   Prone T  10, 3\" NV 10,  3\" ea 10, 3\"     Prone Y     x5, 3\"     Prone I  10, 3\" NV 10,  3\" ea 10, 3\"  10, 3\"   Prone W                    NEUROMUSCULAR REEDUCATION           TAC 5 sec x 10 W/ activities     10\"x10   TAC c walk outs (R out, L out, R in, L in)          TAC c mod march (R up, R down, L up, L down)          TAC c Ball Squeezes          Ball with Bridge          TAC c TB Hooklying Clamshells  Gtb 5\" 2x10 GTB 5 sec x 20 GTB 5 sec x 20 GTB 5 sec x 20  NV   TB Bridges  GTB 5\"x10 GTB 5 sec x 10 GTB 5 sec x 10 GTB 5 sec x 20  5 sec x 10   TAC c Dying Bug                    Quadruped TAC          Quadruped TAC c Alt UT          Quadruped TAC c Alt LEs          Quadruped TAC UE and alt LE                    Prone Plank          Sidelying Plank          Sidelying Plank with clamshells                              TB Scap Retraction GTB 2 sec x 10 GTB 5 sec x 10 NV NV GTB 5 sec x 10  NV   TB B S' Ext GTB 2 sec x 10 GTB 5 sec x 10 NV NV GTB 5 sec x 10  NV   TB B ER  2x5 ea NV NV 2x5 ea     TB Horiz ABD          TB Pallof Press                    Sciatic N glide                              THERAPEUTIC ACTIVITY          UBE (retrograde)          Elliptical    4 mins  4 min                         GAIT TRAINING                                                  MODALITIES         MH extra layers in hooklying with there ex  10 mins with there ex    15 mins to back                Skin checks performed pre and post application: intact      Access Code: [email or web address]  URL: [email or web address]  Date: 04/29/2025  Prepared by: Triny Rice    Exercises  - Chicken Wing  - 1 x daily - 7 x weekly - 1 sets - 10 reps  - Standing with Forearms Thoracic Rotation  - 1 x daily - 7 x weekly - 1 sets - 10 reps  - Standing Thoracic Rotation with " Reach at Wall  - 1 x daily - 7 x weekly - 1 sets - 10 reps  - Drawing [place]  - 1 x daily - 7 x weekly - 1 sets - 10 reps

## 2025-05-06 ENCOUNTER — OFFICE VISIT (OUTPATIENT)
Dept: PHYSICAL THERAPY | Facility: CLINIC | Age: 20
End: 2025-05-06
Payer: COMMERCIAL

## 2025-05-06 DIAGNOSIS — M54.9 MID BACK PAIN: Primary | ICD-10-CM

## 2025-05-06 DIAGNOSIS — R29.3 POOR POSTURE: ICD-10-CM

## 2025-05-06 DIAGNOSIS — G89.29 CHRONIC BILATERAL LOW BACK PAIN WITHOUT SCIATICA: ICD-10-CM

## 2025-05-06 DIAGNOSIS — M54.50 CHRONIC BILATERAL LOW BACK PAIN WITHOUT SCIATICA: ICD-10-CM

## 2025-05-06 DIAGNOSIS — M41.115 JUVENILE IDIOPATHIC SCOLIOSIS OF THORACOLUMBAR REGION: ICD-10-CM

## 2025-05-06 PROCEDURE — 97112 NEUROMUSCULAR REEDUCATION: CPT

## 2025-05-06 PROCEDURE — 97110 THERAPEUTIC EXERCISES: CPT

## 2025-05-06 NOTE — PROGRESS NOTES
Daily Note     Today's date: 2025  Patient name: Dasha Da Silva  : 2005  MRN: [id number]  Referring provider: Allison Mccoy DO  Dx:   Encounter Diagnosis     ICD-10-CM    1. Mid back pain  M54.9       2. Poor posture  R29.3       3. Chronic bilateral low back pain without sciatica  M54.50     G89.29       4. Juvenile idiopathic scoliosis of thoracolumbar region  M41.115             Start Time: 830  Stop Time: 915  Total time in clinic (min): 45 minutes    Subjective: Patient states she feels pretty good. No inc soreness from new scapular activities LV.      Objective: See treatment diary below      Assessment: Continued to progress program with good tolerance. No inc soreness from today's activities. Patient would benefit from continued PT to improve function.      Plan: Continue per plan of care.      Daily Treatment Diary     Precautions: childhood scoliosis    FOTO Completed On: 24    POC Expires Reeval for Medicare to be completed  Unit Limit Auth Expiration Date PT/OT/STVisit Limit   2025 By visit ALEXANDRA MORRIS 2025 30    Completed on visit: ALEXANDRA                   FOTO  DONE  DONE  DONE                        Auth Status DATE FOTO 4/15 4/22 4/29 5/6    Approved Visit # 8 9 10 11 12     Remaining 22 21 20 19 18    MANUAL THERAPY         STM/TPR lumbar spine in prone LQ SMF  LQ PRN PRN    PA glides lumbar and thoracic         C-S PRN SMF (SOR, TPR B UT/LS,  PRN PRN PRN                               THERAPEUTIC EXERCISE HEP         Sidelying thoracic rotation stretch  5 sec x 10 ea 5 sec x 10 ea 5 sec x 10 ea 5 sec x 10 ea 5 sec x 10 ea    PPT in quadruped          [place] with holding PPT  5 sec x 20 5 sec x 10 5 sec x 10 5 sec x 10 5 sec x 10    Seated thoracic extension over half foam                    Seated HS stretch          Seated piriformis stretch                    Scap Retraction          Chin tucks 2 sec x 10 W/ standing activities        Mod pec stretch         "  SNAGs Ext   Instructed on for HEP                 Prone T  10, 3\" NV 10,  3\" ea 10, 3\" 10, 3\"    Prone Y     x5, 3\" 10, 3\"    Prone I  10, 3\" NV 10,  3\" ea 10, 3\" 10, 3\"    Prone W                    NEUROMUSCULAR REEDUCATION           TAC 5 sec x 10 W/ activities        TAC c walk outs (R out, L out, R in, L in)      NV    TAC c mod march (R up, R down, L up, L down)          TAC c Ball Squeezes          Ball with Bridge          TAC c TB Hooklying Clamshells  Gtb 5\" 2x10 GTB 5 sec x 20 GTB 5 sec x 20 GTB 5 sec x 20 GTB 5 sec x 10    TB Bridges  GTB 5\"x10 GTB 5 sec x 10 GTB 5 sec x 10 GTB 5 sec x 20 GTB 5 sec x 20    TAC c Dying Bug      NV              Quadruped TAC          Quadruped TAC c Alt UT          Quadruped TAC c Alt LEs          Quadruped TAC UE and alt LE                    Prone Plank          Sidelying Plank          Sidelying Plank with clamshells                              TB Scap Retraction GTB 2 sec x 10 GTB 5 sec x 10 NV NV GTB 5 sec x 10 GTB 5 sec x 10    TB B S' Ext GTB 2 sec x 10 GTB 5 sec x 10 NV NV GTB 5 sec x 10 GTB 5 sec x 10    TB B ER  2x5 ea NV NV 2x5 ea 10    TB Horiz ABD      NV    TB Pallof Press                    Sciatic N glide                              THERAPEUTIC ACTIVITY          UBE (retrograde)          Elliptical    4 mins  4 min 4 min                        GAIT TRAINING                                                  MODALITIES         MH extra layers in hooklying with there ex  10 mins with there ex   10 mins with there ex                 Skin checks performed pre and post application: intact      Access Code: [email or web address]  URL: [email or web address]  Date: 04/29/2025  Prepared by: Triny Rice    Exercises  - Chicken Wing  - 1 x daily - 7 x weekly - 1 sets - 10 reps  - Standing with Forearms Thoracic Rotation  - 1 x daily - 7 x weekly - 1 sets - 10 reps  - Standing Thoracic Rotation with Reach at Wall  - 1 x daily - 7 x weekly - 1 sets - 10 reps  - " Drawing [place]  - 1 x daily - 7 x weekly - 1 sets - 10 reps

## 2025-05-13 ENCOUNTER — OFFICE VISIT (OUTPATIENT)
Dept: PHYSICAL THERAPY | Facility: CLINIC | Age: 20
End: 2025-05-13
Payer: COMMERCIAL

## 2025-05-13 DIAGNOSIS — M41.115 JUVENILE IDIOPATHIC SCOLIOSIS OF THORACOLUMBAR REGION: ICD-10-CM

## 2025-05-13 DIAGNOSIS — M54.9 MID BACK PAIN: Primary | ICD-10-CM

## 2025-05-13 DIAGNOSIS — G89.29 CHRONIC BILATERAL LOW BACK PAIN WITHOUT SCIATICA: ICD-10-CM

## 2025-05-13 DIAGNOSIS — M54.50 CHRONIC BILATERAL LOW BACK PAIN WITHOUT SCIATICA: ICD-10-CM

## 2025-05-13 DIAGNOSIS — R29.3 POOR POSTURE: ICD-10-CM

## 2025-05-13 PROCEDURE — 97112 NEUROMUSCULAR REEDUCATION: CPT | Performed by: PHYSICAL THERAPIST

## 2025-05-13 PROCEDURE — 97140 MANUAL THERAPY 1/> REGIONS: CPT | Performed by: PHYSICAL THERAPIST

## 2025-05-13 PROCEDURE — 97110 THERAPEUTIC EXERCISES: CPT | Performed by: PHYSICAL THERAPIST

## 2025-05-13 NOTE — PROGRESS NOTES
Daily Note     Today's date: 2025  Patient name: Dasha Da Silva  : 2005  MRN: [id number]  Referring provider: Allison Mccoy DO  Dx:   Encounter Diagnosis     ICD-10-CM    1. Mid back pain  M54.9       2. Poor posture  R29.3       3. Chronic bilateral low back pain without sciatica  M54.50     G89.29       4. Juvenile idiopathic scoliosis of thoracolumbar region  M41.115                      Subjective: She reports that with she was standing yesterday and charting and had onset of L sided low back sharp pain.       Objective: See treatment diary below      Assessment: Tolerated treatment well; initiated POC with Sci[employer] for active warmup fwd and retrograde. MT performed after receiving consent from pt; R sidelying positioning distraction performed today Grade II-IV f/b STM/TPR along lumbar paraspinals. Discussed standing postural awareness with TAC and performed PPT to decrease stress on lumbar spine. Added dead bugs. Updated and reviewed HEP.  Patient demonstrated fatigue post treatment and would benefit from continued PT      Plan: Continue per plan of care.      Daily Treatment Diary     Precautions: childhood scoliosis    FOTO Completed On: 2025    POC Expires Reeval for Medicare to be completed  Unit Limit Auth Expiration Date PT/OT/STVisit Limit   2025 By visit NA NA 2025 30    Completed on visit: ALEXANDRA                   FOTO  DONE  DONE  DONE                        Auth Status DATE FOTO 4/15 4/22 4/29 5/6 5/13   Approved Visit # 8 9 10 11 12 13    Remaining 22 21 20 19 18 17   MANUAL THERAPY         Sidelying positional distraction in R sidelying only      SMF Grade II-IV   Lumbar spine breaking bread in R sidelying      SMF Grade II-III            STM/TPR lumbar spine in prone LQ SMF  LQ PRN PRN SMF   PA glides lumbar and thoracic      SMF   C-S PRN SMF (SOR, TPR B UT/LS,  PRN PRN PRN                               THERAPEUTIC EXERCISE HEP         Sidelying thoracic rotation  "stretch  5 sec x 10 ea 5 sec x 10 ea 5 sec x 10 ea 5 sec x 10 ea 5 sec x 10 ea 5 sec x 20    PPT in quadruped          [place] with holding PPT  5 sec x 20 5 sec x 10 5 sec x 10 5 sec x 10 5 sec x 10 5 sec x 10   Seated thoracic extension over half foam                    Seated HS stretch          Seated piriformis stretch                    Scap Retraction          Chin tucks 2 sec x 10 W/ standing activities        Mod pec stretch          SNAGs Ext   Instructed on for HEP                 Prone T  10, 3\" NV 10,  3\" ea 10, 3\" 10, 3\"    Prone Y     x5, 3\" 10, 3\"    Prone I  10, 3\" NV 10,  3\" ea 10, 3\" 10, 3\"    Prone W                    NEUROMUSCULAR REEDUCATION           TAC 5 sec x 10 W/ activities     10 sec x 10   TAC c walk outs (R out, L out, R in, L in)      NV 10 laps    TAC c mod march (R up, R down, L up, L down)          TAC c Ball Squeezes          Ball with Bridge          TAC c TB Hooklying Clamshells  Gtb 5\" 2x10 GTB 5 sec x 20 GTB 5 sec x 20 GTB 5 sec x 20 GTB 5 sec x 10 GTB 10 sec x 20   TB Bridges  GTB 5\"x10 GTB 5 sec x 10 GTB 5 sec x 10 GTB 5 sec x 20 GTB 5 sec x 20 GTB 5 sec x 20   TAC c Dying Bug      NV 0# 2x10 ea (opposite LE with UE)             Quadruped TAC          Quadruped TAC c Alt UT          Quadruped TAC c Alt LEs          Quadruped TAC UE and alt LE                    Prone Plank          Sidelying Plank          Sidelying Plank with clamshells                              TB Scap Retraction GTB 2 sec x 10 GTB 5 sec x 10 NV NV GTB 5 sec x 10 GTB 5 sec x 10 GTB 2 sec x 10   TB B S' Ext GTB 2 sec x 10 GTB 5 sec x 10 NV NV GTB 5 sec x 10 GTB 5 sec x 10 GTB 2 sec x 10   TB B ER  2x5 ea NV NV 2x5 ea 10 NV   TB Horiz ABD      NV NV   TB Pallof Press                    Sciatic N glide                              THERAPEUTIC ACTIVITY          UBE (retrograde)       2 mins fwd and 2 mins backwards   Elliptical    4 mins  4 min 4 min NV                       GAIT TRAINING           "                                        MODALITIES         MH extra layers in hooklying with there ex  10 mins with there ex   10 mins with there ex 10 mins with there ex                Skin checks performed pre and post application: intact      Access Code: [email or web address]  URL: [email or web address]  Date: 04/29/2025  Prepared by: Triny Rice    Exercises  - Chicken Wing  - 1 x daily - 7 x weekly - 1 sets - 10 reps  - Standing with Forearms Thoracic Rotation  - 1 x daily - 7 x weekly - 1 sets - 10 reps  - Standing Thoracic Rotation with Reach at Wall  - 1 x daily - 7 x weekly - 1 sets - 10 reps  - Drawing [place]  - 1 x daily - 7 x weekly - 1 sets - 10 reps

## 2025-05-22 ENCOUNTER — OFFICE VISIT (OUTPATIENT)
Dept: URGENT CARE | Facility: CLINIC | Age: 20
End: 2025-05-22
Payer: COMMERCIAL

## 2025-05-22 VITALS
TEMPERATURE: 98.2 F | HEART RATE: 80 BPM | RESPIRATION RATE: 16 BRPM | DIASTOLIC BLOOD PRESSURE: 64 MMHG | OXYGEN SATURATION: 99 % | SYSTOLIC BLOOD PRESSURE: 108 MMHG

## 2025-05-22 DIAGNOSIS — L23.7 POISON IVY DERMATITIS: Primary | ICD-10-CM

## 2025-05-22 PROCEDURE — G0382 LEV 3 HOSP TYPE B ED VISIT: HCPCS

## 2025-05-22 RX ORDER — PREDNISONE 10 MG/1
TABLET ORAL
Qty: 30 TABLET | Refills: 0 | Status: SHIPPED | OUTPATIENT
Start: 2025-05-22 | End: 2025-06-01

## 2025-05-22 NOTE — PROGRESS NOTES
"  [facility]        NAME: Dasha Da Silva is a 19 y.o. female  : 2005    MRN: [id number]  DATE: May 22, 2025  TIME: 8:03 PM    Assessment and Plan   Poison ivy dermatitis [L23.7]  1. Poison ivy dermatitis  predniSONE 10 mg tablet            Patient Instructions       Follow up with PCP in 3-5 days.  Proceed to  ER if symptoms worsen.    If tests have been performed at [facility], our office will contact you with results if changes need to be made to the care plan discussed with you at the visit.  [facility]    Chief Complaint     Chief Complaint   Patient presents with    Rash     Patient thinks she was exposed to poison ivy . Patient only had the rash on her R leg. Patient states as the week progressed, it has spread to her R arm and L leg as well, and become larger and itchier.          History of Present Illness       19-year-old female presents for \"poison ivy \"for 1 week.  Patient which was recently pulling weeds.  Shortly after developed pruritic rash.  Located on bilateral thighs and right forearm.  Using calamine lotion and witch hazel without relief    Rash  Pertinent negatives include no fever.       Review of Systems   Review of Systems   Constitutional:  Negative for chills and fever.   Skin:  Positive for rash.         Current Medications     Current Medications[1]    Current Allergies     Allergies as of 2025    (No Known Allergies)            The following portions of the patient's history were reviewed and updated as appropriate: allergies, current medications, past family history, past medical history, past social history, past surgical history and problem list.     Past Medical History[2]    Past Surgical History[3]    Family History[4]      Medications have been verified.        Objective   /64   Pulse 80   Temp 98.2 °F (36.8 °C)   Resp 16   LMP 2025 (Exact Date)   SpO2 99%   Patient's last menstrual period " was 05/19/2025 (exact date).       Physical Exam     Physical Exam  Vitals and nursing note reviewed.   Constitutional:       General: She is not in acute distress.     Appearance: She is not toxic-appearing.   HENT:      Head: Normocephalic and atraumatic.     Eyes:      Conjunctiva/sclera: Conjunctivae normal.     Pulmonary:      Effort: Pulmonary effort is normal.     Skin:     Findings: Rash present.     Neurological:      Mental Status: She is alert.      Coordination: Coordination normal.      Gait: Gait normal.     Psychiatric:         Mood and Affect: Mood normal.         Behavior: Behavior normal.                        [1]   Current Outpatient Medications:     predniSONE 10 mg tablet, Take 5 tablets (50 mg total) by mouth daily for 2 days, THEN 4 tablets (40 mg total) daily for 2 days, THEN 3 tablets (30 mg total) daily for 2 days, THEN 2 tablets (20 mg total) daily for 2 days, THEN 1 tablet (10 mg total) daily for 2 days., Disp: 30 tablet, Rfl: 0    Adapalene 0.3 % gel, , Disp: , Rfl:     busPIRone (BUSPAR) 10 mg tablet, TAKE 1 TABLET BY MOUTH THREE TIMES A DAY AS NEEDED FOR ANXIETY, Disp: 270 tablet, Rfl: 1    drospirenone-ethinyl estradiol (Vestura) 3-0.02 MG per tablet, TAKE 1 TABLET BY MOUTH EVERY DAY, Disp: 84 tablet, Rfl: 1    sertraline (ZOLOFT) 50 mg tablet, TAKE 1 AND 1/2 TABLETS DAILY BY MOUTH, Disp: 135 tablet, Rfl: 1    spironolactone (ALDACTONE) 50 mg tablet, Take 1 tablet by mouth 2 (two) times a day, Disp: , Rfl:     tretinoin (RETIN-A) 0.025 % cream, APPLY TO FACE ONCE NIGHTLY, Disp: , Rfl:   [2]   Past Medical History:  Diagnosis Date    Concussion     Concussion without loss of consciousness 06/15/2018    Tinea versicolor    [3]   Past Surgical History:  Procedure Laterality Date    NO PAST SURGERIES     [4]   Family History  Problem Relation Name Age of Onset    Other Father          smoker    Heart attack Other grandfather     Skin cancer Maternal Grandmother      Cancer Maternal  Grandfather          bone    Skin cancer Maternal Grandfather

## 2025-05-23 DIAGNOSIS — N94.6 DYSMENORRHEA IN ADOLESCENT: ICD-10-CM

## 2025-05-23 RX ORDER — DROSPIRENONE AND ETHINYL ESTRADIOL 0.02-3(28)
1 KIT ORAL DAILY
Qty: 84 TABLET | Refills: 1 | Status: SHIPPED | OUTPATIENT
Start: 2025-05-23

## 2025-05-23 RX ORDER — HYDROCORTISONE 25 MG/G
CREAM TOPICAL 2 TIMES DAILY
Qty: 20 G | Refills: 0 | Status: SHIPPED | OUTPATIENT
Start: 2025-05-23

## 2025-05-27 ENCOUNTER — APPOINTMENT (OUTPATIENT)
Dept: PHYSICAL THERAPY | Facility: CLINIC | Age: 20
End: 2025-05-27
Payer: COMMERCIAL